# Patient Record
Sex: MALE | Race: OTHER | NOT HISPANIC OR LATINO | URBAN - METROPOLITAN AREA
[De-identification: names, ages, dates, MRNs, and addresses within clinical notes are randomized per-mention and may not be internally consistent; named-entity substitution may affect disease eponyms.]

---

## 2018-05-10 ENCOUNTER — INPATIENT (INPATIENT)
Facility: HOSPITAL | Age: 53
LOS: 5 days | Discharge: HOME HEALTH | End: 2018-05-16
Attending: THORACIC SURGERY (CARDIOTHORACIC VASCULAR SURGERY) | Admitting: THORACIC SURGERY (CARDIOTHORACIC VASCULAR SURGERY)
Payer: MEDICAID

## 2018-05-10 VITALS
SYSTOLIC BLOOD PRESSURE: 142 MMHG | HEART RATE: 72 BPM | TEMPERATURE: 95 F | RESPIRATION RATE: 20 BRPM | OXYGEN SATURATION: 98 % | DIASTOLIC BLOOD PRESSURE: 92 MMHG

## 2018-05-10 DIAGNOSIS — Z82.49 FAMILY HISTORY OF ISCHEMIC HEART DISEASE AND OTHER DISEASES OF THE CIRCULATORY SYSTEM: ICD-10-CM

## 2018-05-10 DIAGNOSIS — I25.119 ATHEROSCLEROTIC HEART DISEASE OF NATIVE CORONARY ARTERY WITH UNSPECIFIED ANGINA PECTORIS: ICD-10-CM

## 2018-05-10 DIAGNOSIS — R73.9 HYPERGLYCEMIA, UNSPECIFIED: ICD-10-CM

## 2018-05-10 DIAGNOSIS — I25.10 ATHEROSCLEROTIC HEART DISEASE OF NATIVE CORONARY ARTERY WITHOUT ANGINA PECTORIS: ICD-10-CM

## 2018-05-10 DIAGNOSIS — R94.39 ABNORMAL RESULT OF OTHER CARDIOVASCULAR FUNCTION STUDY: ICD-10-CM

## 2018-05-10 DIAGNOSIS — K63.5 POLYP OF COLON: Chronic | ICD-10-CM

## 2018-05-10 DIAGNOSIS — M54.9 DORSALGIA, UNSPECIFIED: ICD-10-CM

## 2018-05-10 DIAGNOSIS — Z98.890 OTHER SPECIFIED POSTPROCEDURAL STATES: Chronic | ICD-10-CM

## 2018-05-10 DIAGNOSIS — Z98.61 CORONARY ANGIOPLASTY STATUS: ICD-10-CM

## 2018-05-10 DIAGNOSIS — D62 ACUTE POSTHEMORRHAGIC ANEMIA: ICD-10-CM

## 2018-05-10 DIAGNOSIS — Z98.1 ARTHRODESIS STATUS: Chronic | ICD-10-CM

## 2018-05-10 DIAGNOSIS — Z98.1 ARTHRODESIS STATUS: ICD-10-CM

## 2018-05-10 DIAGNOSIS — Z87.19 PERSONAL HISTORY OF OTHER DISEASES OF THE DIGESTIVE SYSTEM: ICD-10-CM

## 2018-05-10 DIAGNOSIS — Z87.891 PERSONAL HISTORY OF NICOTINE DEPENDENCE: ICD-10-CM

## 2018-05-10 DIAGNOSIS — G89.29 OTHER CHRONIC PAIN: ICD-10-CM

## 2018-05-10 DIAGNOSIS — I10 ESSENTIAL (PRIMARY) HYPERTENSION: ICD-10-CM

## 2018-05-10 DIAGNOSIS — Z86.010 PERSONAL HISTORY OF COLONIC POLYPS: ICD-10-CM

## 2018-05-10 LAB
ALBUMIN SERPL ELPH-MCNC: 4.1 G/DL — SIGNIFICANT CHANGE UP (ref 3.5–5.2)
ALP SERPL-CCNC: 63 U/L — SIGNIFICANT CHANGE UP (ref 30–115)
ALT FLD-CCNC: 14 U/L — SIGNIFICANT CHANGE UP (ref 0–41)
ANION GAP SERPL CALC-SCNC: 13 MMOL/L — SIGNIFICANT CHANGE UP (ref 7–14)
APPEARANCE UR: CLEAR — SIGNIFICANT CHANGE UP
APTT BLD: 30.2 SEC — SIGNIFICANT CHANGE UP (ref 27–39.2)
AST SERPL-CCNC: 12 U/L — SIGNIFICANT CHANGE UP (ref 0–41)
BILIRUB SERPL-MCNC: 0.7 MG/DL — SIGNIFICANT CHANGE UP (ref 0.2–1.2)
BILIRUB UR-MCNC: NEGATIVE — SIGNIFICANT CHANGE UP
BLD GP AB SCN SERPL QL: SIGNIFICANT CHANGE UP
BUN SERPL-MCNC: 13 MG/DL — SIGNIFICANT CHANGE UP (ref 10–20)
CALCIUM SERPL-MCNC: 8.5 MG/DL — SIGNIFICANT CHANGE UP (ref 8.5–10.1)
CHLORIDE SERPL-SCNC: 102 MMOL/L — SIGNIFICANT CHANGE UP (ref 98–110)
CO2 SERPL-SCNC: 26 MMOL/L — SIGNIFICANT CHANGE UP (ref 17–32)
COLOR SPEC: YELLOW — SIGNIFICANT CHANGE UP
CREAT SERPL-MCNC: 0.8 MG/DL — SIGNIFICANT CHANGE UP (ref 0.7–1.5)
DIFF PNL FLD: NEGATIVE — SIGNIFICANT CHANGE UP
ESTIMATED AVERAGE GLUCOSE: 120 MG/DL — HIGH (ref 68–114)
GLUCOSE SERPL-MCNC: 103 MG/DL — HIGH (ref 70–99)
GLUCOSE UR QL: NEGATIVE MG/DL — SIGNIFICANT CHANGE UP
HBA1C BLD-MCNC: 5.8 % — HIGH (ref 4–5.6)
HCT VFR BLD CALC: 39.3 % — LOW (ref 42–52)
HGB BLD-MCNC: 13.9 G/DL — LOW (ref 14–18)
INR BLD: 1.07 RATIO — SIGNIFICANT CHANGE UP (ref 0.65–1.3)
KETONES UR-MCNC: NEGATIVE — SIGNIFICANT CHANGE UP
LEUKOCYTE ESTERASE UR-ACNC: NEGATIVE — SIGNIFICANT CHANGE UP
MCHC RBC-ENTMCNC: 29.8 PG — SIGNIFICANT CHANGE UP (ref 27–31)
MCHC RBC-ENTMCNC: 35.4 G/DL — SIGNIFICANT CHANGE UP (ref 32–37)
MCV RBC AUTO: 84.3 FL — SIGNIFICANT CHANGE UP (ref 80–94)
MRSA PCR RESULT.: NEGATIVE — SIGNIFICANT CHANGE UP
NITRITE UR-MCNC: NEGATIVE — SIGNIFICANT CHANGE UP
NRBC # BLD: 0 /100 WBCS — SIGNIFICANT CHANGE UP (ref 0–0)
PH UR: 6.5 — SIGNIFICANT CHANGE UP (ref 5–8)
PLATELET # BLD AUTO: 223 K/UL — SIGNIFICANT CHANGE UP (ref 130–400)
POTASSIUM SERPL-MCNC: 4.2 MMOL/L — SIGNIFICANT CHANGE UP (ref 3.5–5)
POTASSIUM SERPL-SCNC: 4.2 MMOL/L — SIGNIFICANT CHANGE UP (ref 3.5–5)
PROT SERPL-MCNC: 6.3 G/DL — SIGNIFICANT CHANGE UP (ref 6–8)
PROT UR-MCNC: NEGATIVE MG/DL — SIGNIFICANT CHANGE UP
PROTHROM AB SERPL-ACNC: 11.6 SEC — SIGNIFICANT CHANGE UP (ref 9.95–12.87)
RBC # BLD: 4.66 M/UL — LOW (ref 4.7–6.1)
RBC # FLD: 11.9 % — SIGNIFICANT CHANGE UP (ref 11.5–14.5)
SODIUM SERPL-SCNC: 141 MMOL/L — SIGNIFICANT CHANGE UP (ref 135–146)
SP GR SPEC: 1.01 — SIGNIFICANT CHANGE UP (ref 1.01–1.03)
TYPE + AB SCN PNL BLD: SIGNIFICANT CHANGE UP
UROBILINOGEN FLD QL: 0.2 MG/DL — SIGNIFICANT CHANGE UP (ref 0.2–0.2)
WBC # BLD: 5.44 K/UL — SIGNIFICANT CHANGE UP (ref 4.8–10.8)
WBC # FLD AUTO: 5.44 K/UL — SIGNIFICANT CHANGE UP (ref 4.8–10.8)

## 2018-05-10 PROCEDURE — 93880 EXTRACRANIAL BILAT STUDY: CPT | Mod: 26

## 2018-05-10 RX ORDER — METOPROLOL TARTRATE 50 MG
12.5 TABLET ORAL EVERY 12 HOURS
Qty: 0 | Refills: 0 | Status: DISCONTINUED | OUTPATIENT
Start: 2018-05-10 | End: 2018-05-10

## 2018-05-10 RX ORDER — ASPIRIN/CALCIUM CARB/MAGNESIUM 324 MG
325 TABLET ORAL DAILY
Qty: 0 | Refills: 0 | Status: DISCONTINUED | OUTPATIENT
Start: 2018-05-10 | End: 2018-05-11

## 2018-05-10 RX ORDER — DIAZEPAM 5 MG
10 TABLET ORAL AT BEDTIME
Qty: 0 | Refills: 0 | Status: DISCONTINUED | OUTPATIENT
Start: 2018-05-10 | End: 2018-05-10

## 2018-05-10 RX ORDER — CHLORHEXIDINE GLUCONATE 213 G/1000ML
1 SOLUTION TOPICAL ONCE
Qty: 0 | Refills: 0 | Status: COMPLETED | OUTPATIENT
Start: 2018-05-10 | End: 2018-05-10

## 2018-05-10 RX ORDER — SODIUM CHLORIDE 9 MG/ML
1000 INJECTION INTRAMUSCULAR; INTRAVENOUS; SUBCUTANEOUS
Qty: 0 | Refills: 0 | Status: DISCONTINUED | OUTPATIENT
Start: 2018-05-10 | End: 2018-05-11

## 2018-05-10 RX ORDER — ACETAMINOPHEN 500 MG
650 TABLET ORAL EVERY 4 HOURS
Qty: 0 | Refills: 0 | Status: DISCONTINUED | OUTPATIENT
Start: 2018-05-10 | End: 2018-05-11

## 2018-05-10 RX ORDER — METOPROLOL TARTRATE 50 MG
12.5 TABLET ORAL ONCE
Qty: 0 | Refills: 0 | Status: COMPLETED | OUTPATIENT
Start: 2018-05-11 | End: 2018-05-11

## 2018-05-10 RX ORDER — ATORVASTATIN CALCIUM 80 MG/1
10 TABLET, FILM COATED ORAL AT BEDTIME
Qty: 0 | Refills: 0 | Status: DISCONTINUED | OUTPATIENT
Start: 2018-05-10 | End: 2018-05-11

## 2018-05-10 RX ORDER — DIAZEPAM 5 MG
5 TABLET ORAL ONCE
Qty: 0 | Refills: 0 | Status: DISCONTINUED | OUTPATIENT
Start: 2018-05-10 | End: 2018-05-10

## 2018-05-10 RX ORDER — CHLORHEXIDINE GLUCONATE 213 G/1000ML
15 SOLUTION TOPICAL ONCE
Qty: 0 | Refills: 0 | Status: COMPLETED | OUTPATIENT
Start: 2018-05-10 | End: 2018-05-11

## 2018-05-10 RX ORDER — CHLORHEXIDINE GLUCONATE 213 G/1000ML
1 SOLUTION TOPICAL ONCE
Qty: 0 | Refills: 0 | Status: COMPLETED | OUTPATIENT
Start: 2018-05-11 | End: 2018-05-11

## 2018-05-10 RX ORDER — HEPARIN SODIUM 5000 [USP'U]/ML
INJECTION INTRAVENOUS; SUBCUTANEOUS
Qty: 25000 | Refills: 0 | Status: DISCONTINUED | OUTPATIENT
Start: 2018-05-10 | End: 2018-05-11

## 2018-05-10 RX ORDER — PANTOPRAZOLE SODIUM 20 MG/1
40 TABLET, DELAYED RELEASE ORAL
Qty: 0 | Refills: 0 | Status: DISCONTINUED | OUTPATIENT
Start: 2018-05-10 | End: 2018-05-11

## 2018-05-10 RX ADMIN — CHLORHEXIDINE GLUCONATE 1 APPLICATION(S): 213 SOLUTION TOPICAL at 22:47

## 2018-05-10 RX ADMIN — Medication 12.5 MILLIGRAM(S): at 17:28

## 2018-05-10 RX ADMIN — Medication 5 MILLIGRAM(S): at 11:44

## 2018-05-10 RX ADMIN — CHLORHEXIDINE GLUCONATE 1 APPLICATION(S): 213 SOLUTION TOPICAL at 20:00

## 2018-05-10 RX ADMIN — SODIUM CHLORIDE 50 MILLILITER(S): 9 INJECTION INTRAMUSCULAR; INTRAVENOUS; SUBCUTANEOUS at 08:01

## 2018-05-10 RX ADMIN — Medication 325 MILLIGRAM(S): at 11:45

## 2018-05-10 RX ADMIN — Medication 12.5 MILLIGRAM(S): at 11:29

## 2018-05-10 RX ADMIN — ATORVASTATIN CALCIUM 10 MILLIGRAM(S): 80 TABLET, FILM COATED ORAL at 21:33

## 2018-05-10 RX ADMIN — PANTOPRAZOLE SODIUM 40 MILLIGRAM(S): 20 TABLET, DELAYED RELEASE ORAL at 11:29

## 2018-05-10 RX ADMIN — HEPARIN SODIUM 830 UNIT(S)/HR: 5000 INJECTION INTRAVENOUS; SUBCUTANEOUS at 13:36

## 2018-05-10 RX ADMIN — SODIUM CHLORIDE 50 MILLILITER(S): 9 INJECTION INTRAMUSCULAR; INTRAVENOUS; SUBCUTANEOUS at 11:45

## 2018-05-10 RX ADMIN — SODIUM CHLORIDE 50 MILLILITER(S): 9 INJECTION INTRAMUSCULAR; INTRAVENOUS; SUBCUTANEOUS at 13:36

## 2018-05-10 RX ADMIN — Medication 10 MILLIGRAM(S): at 21:34

## 2018-05-10 NOTE — PRE-ANESTHESIA EVALUATION ADULT - NSANTHLABRESULTSFT_GEN_ALL_CORE
13.9   5.44  )-----------( 223      ( 10 May 2018 07:11 )             39.3     05-10    141  |  102  |  13  ----------------------------<  103<H>  4.2   |  26  |  0.8    Ca    8.5      10 May 2018 07:11    TPro  6.3  /  Alb  4.1  /  TBili  0.7  /  DBili  x   /  AST  12  /  ALT  14  /  AlkPhos  63  05-10    PT/INR - ( 10 May 2018 07:11 )   PT: 11.60 sec;   INR: 1.07 ratio         PTT - ( 10 May 2018 07:11 )  PTT:30.2 sec

## 2018-05-10 NOTE — ED PROVIDER NOTE - CARE PLAN
Principal Discharge DX:	Coronary artery disease involving native coronary artery of native heart, angina presence unspecified

## 2018-05-10 NOTE — ED ADULT TRIAGE NOTE - CHIEF COMPLAINT QUOTE
Pt came c/o left sided chest pain started this morning Pt came c/o left sided chest pain started this morning, pt stated he had an abnormal cardiac cath. yesterday.

## 2018-05-10 NOTE — ED PROVIDER NOTE - NS ED ROS FT
Constitutional: (-) fever  Eyes/ENT: (-) blurry vision, (-) epistaxis  Cardiovascular: (-) chest pain, (-) syncope  Respiratory: (-) cough, (-) shortness of breath  Gastrointestinal: (-) vomiting, (-) diarrhea  Musculoskeletal: (-) neck pain, (+) chronic back pain, (-) joint pain  Integumentary: (-) rash, (-) edema  Neurological: (-) headache, (-) altered mental status  Psychiatric: (-) hallucinations  Allergic/Immunologic: (-) pruritus

## 2018-05-10 NOTE — PRE-ANESTHESIA EVALUATION ADULT - NSANTHOSAYNRD_GEN_A_CORE
No. IBRAHIMA screening performed.  STOP BANG Legend: 0-2 = LOW Risk; 3-4 = INTERMEDIATE Risk; 5-8 = HIGH Risk

## 2018-05-10 NOTE — H&P ADULT - NSHPPHYSICALEXAM_GEN_ALL_CORE
Alert and Oriented, well nourished well hydrated  PERRLA, EOMI, anicteric  no nasal or ear discharge  oral hygiene good  neck supple no JVD or goiter  chest: CTA   COr; RSR, s1  s2 no murmur  ABD: soft NT ND = BS  Ext: no edema, full ROM, good strength, + scar on back  VAsc: 2+ DP, FEM, RAD and brachial bilateral  Neuro: no focal deficits

## 2018-05-10 NOTE — ED PROVIDER NOTE - PHYSICAL EXAMINATION
Vital Signs: I have reviewed the initial vital signs.  Constitutional: well-nourished, appears stated age, no acute distress  HEENT; PERRL, pink conjunctivae, supple neck  Cardiovascular: regular rate, regular rhythm, well-perfused extremities, capillary refill < 2 sec  Respiratory: unlabored respiratory effort, clear to auscultation bilaterally, speaking full sentences  Gastrointestinal: soft, non-tender abdomen, no pulsatile mass  Musculoskeletal: supple neck, no lower extremity edema, FROM at all joints  Integumentary: warm, dry, no rash  Neurologic: A&Ox3, no gross neuro deficits  Psychiatric: appropriate mood, appropriate affect

## 2018-05-10 NOTE — ED ADULT NURSE NOTE - CHIEF COMPLAINT QUOTE
Pt came c/o left sided chest pain started this morning, pt stated he had an abnormal cardiac cath. yesterday.

## 2018-05-10 NOTE — ED PROVIDER NOTE - OBJECTIVE STATEMENT
53 yo male h/o CBP s/p spinal fusion presenting for admission after abnormal cardiac cath 2 days ago at a hospital in [place].  Patient reports h/o abnormal ECG and stress test obtained as a part of medical clearance.  His father passed away suddenly a year ago and was found to have severe 3 vessel disease.  Denies pain a present or any other acute complaints.  Will admit to CT surgery for definitive treatment.  Orders were placed by CT surgery PA.

## 2018-05-10 NOTE — H&P ADULT - PMH
Coronary artery disease involving native coronary artery of native heart, angina presence unspecified    Disc disorder of lumbar region    Gastritis due to Helicobacter species

## 2018-05-10 NOTE — H&P ADULT - HISTORY OF PRESENT ILLNESS
53 yo male presents to ED with anginal symptoms and known left main coronary artery disease. He had recent catheterization 18 that demonstrated advanced CAD. Catheterization was done secondary to + stress test. Primary symptoms of CRAWFORD, easily fatigability and intermittent left chest discomfort/ itching sensation. NO Chest pain now. no diaphoresis. Father  of MI at age 70

## 2018-05-10 NOTE — PRE-ANESTHESIA EVALUATION ADULT - NSANTHPMHFT_GEN_ALL_CORE
52 y.o M with PMHx of CAD presented with dyspnea on exertion, left chest discomfort. Known h/o left main coronary disease with recent catheterization 5/8/18. For CABG. 52 y.o M with PMHx of CAD presented with dyspnea on exertion, left chest discomfort. Known h/o left main coronary disease with recent catheterization 5/8/18 at outside facility. For CABG.

## 2018-05-10 NOTE — PROGRESS NOTE ADULT - SUBJECTIVE AND OBJECTIVE BOX
Cardiac Surgery Pre-op Note:  CC: Patient is a 52y old  Male who presents with a chief complaint of angina  with coronary artery disease (10 May 2018 07:11)      Referring Physician:    Keon Dykes MD cardiologist  	                    Filomena Mckay MD primary                                                                          Surgeon: Kerrie  Procedure: (Date) (Procedure) 18 CABG    Allergies    No Known Allergies  Intolerances    HPI:  53 yo male presents to ED with anginal symptoms and known left main coronary artery disease. He had recent catheterization 18 that demonstrated advanced CAD. Catheterization was done secondary to + stress test. Primary symptoms of CRAWFORD, easily fatigability and intermittent left chest discomfort/ itching sensation. NO Chest pain now. no diaphoresis. Father  of MI at age 70 (10 May 2018 07:11)      PAST MEDICAL & SURGICAL HISTORY:  Gastritis due to Helicobacter species  Disc disorder of lumbar region  Coronary artery disease involving native coronary artery of native heart, angina presence unspecified  Colorectal polyp detected on colonoscopy  H/O: hemorrhoidectomy  History of lumbar fusion      MEDICATIONS  (STANDING):  aspirin 325 milliGRAM(s) Oral daily  atorvastatin 10 milliGRAM(s) Oral at bedtime  diazepam    Tablet 5 milliGRAM(s) Oral at bedtime  heparin  Infusion.  Unit(s)/Hr (8.3 mL/Hr) IV Continuous <Continuous>  metoprolol tartrate 12.5 milliGRAM(s) Oral every 12 hours  pantoprazole    Tablet 40 milliGRAM(s) Oral before breakfast  sodium chloride 0.9%. 1000 milliLiter(s) (50 mL/Hr) IV Continuous <Continuous>    MEDICATIONS  (PRN):    Labs:                      13.9   5.44  )-----------( 223      ( 10 May 2018 07:11 )             39.3     05-10    141  |  102  |  13  ----------------------------<  103<H>  4.2   |  26  |  0.8    Ca    8.5      10 May 2018 07:11    TPro  6.3  /  Alb  4.1  /  TBili  0.7  /  DBili  x   /  AST  12  /  ALT  14  /  AlkPhos  63  05-10    PT/INR - ( 10 May 2018 07:11 )   PT: 11.60 sec;   INR: 1.07 ratio         PTT - ( 10 May 2018 07:11 )  PTT:30.2 sec    Blood Type:   HGB A1C: Hemoglobin A1C, Whole Blood: 5.8 % (05-10 @ 07:11)    Urinalysis Basic - ( 10 May 2018 07:11 )    Color: Yellow / Appearance: Clear / S.015 / pH: x  Gluc: x / Ketone: Negative  / Bili: Negative / Urobili: 0.2 mg/dL   Blood: x / Protein: Negative mg/dL / Nitrite: Negative   Leuk Esterase: Negative / RBC: x / WBC x   Sq Epi: x / Non Sq Epi: x / Bacteria: x    CXR:   < from: Xray Chest 1 View- PORTABLE-Routine (05.10.18 @ 09:02) >  Impression:      No radiographic evidence of acute cardiopulmonary disease.    < end of copied text >    EKG:  < from: 12 Lead ECG (05.10.18 @ 06:22) >  Diagnosis Line Normal sinus rhythm  Normal ECG    < end of copied text >    Carotid Duplex:    < from: VA Duplex Carotid, Bilat (05.10.18 @ 10:36) >  20-39% stenosis of the right internal carotid artery.    20-39% stenosis of the left internal carotid artery.    < end of copied text >    PFT's: N/A    Echocardiogram:  official pending , normal EF    Cardiac catheterization: left main with multi-vessel involvement    Gen: WN/WD NAD  Neuro: A&Ox3, nonfocal  Pulm: CTA B/L  CV: RRR, S1S2 systolic murmur  Abd: Soft, NT, ND +BS  Ext: No edema, + peripheral pulses, Floyd negative right, ? on left    Cardic Surgery Risk Factors  CVA and/or carotid/cerebrovascular disease. Yes  No  Explain if Yes 20-39% bilateral  Aortoiliac disease Yes  No  Explain if Yes  Previous MI Yes  No  Explain if Yes  Previos Cardiac Surgery Yes  No  Explain if Yes  Hemodynamics-Unstable or Shock Yes  No  Explain if Yes  Diabetis Yes  No  Explain if Yes  Hepatic Failure Yes  No  Explain if Yes  Renal failure and/or dialysis Yes  No  Explain if Yes  Heart failure-type-present or past Yes  No  Explain if Yes  COPD Yes  No  Explain if Yes  Immune System Deficiency Yes No  Explain if Yes  Malignant Ventricular Arrhythmia Yes No  Explain if Yes  Family hx: father MI 69 yo    Five Meter: 7.1/6.5/6.3    BP    R 121/85   L127 / 90    STS Score:   Procedure: CAB Only (with normal valve data assumed)    Risk of Mortality: 1.28%    Morbidity or Mortality: 11.291%    Long Length of Stay: 2.943%    Short Length of Stay: 51.803%    Permanent Stroke: 0.459%    Prolonged Ventilation: 8.557%    DSW Infection: 0.204%    Renal Failure: N/A    Reoperation: 5.13%        Pt has AICD/PPM [ ] Yes  [x ] No             Brand Name:  Pre-op Beta Blocker ordered within 24 hrs of surgery (CABG ONLY)?  [x ] Yes  [ ] No  If not, Why?  Type & Cross  [ X ] Yes  [ ] No  NPO after Midnight [x ] Yes  [ ] No  Hibiclens/Peridex ordered [x ] Yes  [ ] No  Intraop on Hold: PRBCs, CXR, ANAHI [x ]   Consent obtained  [x ] Yes  [ ] No Cardiac Surgery Pre-op Note:  CC: Patient is a 52y old  Male who presents with a chief complaint of angina  with coronary artery disease (10 May 2018 07:11)      Referring Physician:    Keon Dykes MD cardiologist  	                    Filomena Mckay MD primary                                                                          Surgeon: Kerrie  Procedure: (Date) (Procedure) 18 CABG    Allergies    No Known Allergies  Intolerances    HPI:  53 yo male presents to ED with anginal symptoms and known left main coronary artery disease. He had recent catheterization 18 that demonstrated advanced CAD. Catheterization was done secondary to + stress test. Primary symptoms of CRAWFORD, easily fatigability and intermittent left chest discomfort/ itching sensation. NO Chest pain now. no diaphoresis. Father  of MI at age 70 (10 May 2018 07:11)      PAST MEDICAL & SURGICAL HISTORY:  Gastritis due to Helicobacter species  Disc disorder of lumbar region  Coronary artery disease involving native coronary artery of native heart, angina presence unspecified  Colorectal polyp detected on colonoscopy  H/O: hemorrhoidectomy  History of lumbar fusion      MEDICATIONS  (STANDING):  aspirin 325 milliGRAM(s) Oral daily  atorvastatin 10 milliGRAM(s) Oral at bedtime  diazepam    Tablet 5 milliGRAM(s) Oral at bedtime  heparin  Infusion.  Unit(s)/Hr (8.3 mL/Hr) IV Continuous <Continuous>  metoprolol tartrate 12.5 milliGRAM(s) Oral every 12 hours  pantoprazole    Tablet 40 milliGRAM(s) Oral before breakfast  sodium chloride 0.9%. 1000 milliLiter(s) (50 mL/Hr) IV Continuous <Continuous>    MEDICATIONS  (PRN):    Labs:                      13.9   5.44  )-----------( 223      ( 10 May 2018 07:11 )             39.3     05-10    141  |  102  |  13  ----------------------------<  103<H>  4.2   |  26  |  0.8    Ca    8.5      10 May 2018 07:11    TPro  6.3  /  Alb  4.1  /  TBili  0.7  /  DBili  x   /  AST  12  /  ALT  14  /  AlkPhos  63  05-10    PT/INR - ( 10 May 2018 07:11 )   PT: 11.60 sec;   INR: 1.07 ratio         PTT - ( 10 May 2018 07:11 )  PTT:30.2 sec    Blood Type:   HGB A1C: Hemoglobin A1C, Whole Blood: 5.8 % (05-10 @ 07:11)    Urinalysis Basic - ( 10 May 2018 07:11 )    Color: Yellow / Appearance: Clear / S.015 / pH: x  Gluc: x / Ketone: Negative  / Bili: Negative / Urobili: 0.2 mg/dL   Blood: x / Protein: Negative mg/dL / Nitrite: Negative   Leuk Esterase: Negative / RBC: x / WBC x   Sq Epi: x / Non Sq Epi: x / Bacteria: x    CXR:   < from: Xray Chest 1 View- PORTABLE-Routine (05.10.18 @ 09:02) >  Impression:      No radiographic evidence of acute cardiopulmonary disease.    < end of copied text >    EKG:  < from: 12 Lead ECG (05.10.18 @ 06:22) >  Diagnosis Line Normal sinus rhythm  Normal ECG    < end of copied text >    Carotid Duplex:    < from: VA Duplex Carotid, Bilat (05.10.18 @ 10:36) >  20-39% stenosis of the right internal carotid artery.    20-39% stenosis of the left internal carotid artery.    < end of copied text >    PFT's: N/A    Echocardiogram:  < from: Transthoracic Echocardiogram (05.10.18 @ 14:51) >  Summary:   1. Left ventricular ejection fraction, by visual estimation, is 55 to   60%.   2. Normal global left ventricular systolic function.   3. Spectral Doppler shows impaired relaxation pattern of left   ventricular myocardial filling (Grade I diastolic dysfunction).   4. Mild mitral valve regurgitation.    Cardiac catheterization: left main with multi-vessel involvement    Gen: WN/WD NAD  Neuro: A&Ox3, nonfocal  Pulm: CTA B/L  CV: RRR, S1S2 systolic murmur  Abd: Soft, NT, ND +BS  Ext: No edema, + peripheral pulses, Floyd negative right, ? on left    Cardic Surgery Risk Factors  CVA and/or carotid/cerebrovascular disease. Yes  No  Explain if Yes 20-39% bilateral  Aortoiliac disease Yes  No  Explain if Yes  Previous MI Yes  No  Explain if Yes  Previos Cardiac Surgery Yes  No  Explain if Yes  Hemodynamics-Unstable or Shock Yes  No  Explain if Yes  Diabetis Yes  No  Explain if Yes  Hepatic Failure Yes  No  Explain if Yes  Renal failure and/or dialysis Yes  No  Explain if Yes  Heart failure-type-present or past Yes  No  Explain if Yes  COPD Yes  No  Explain if Yes  Immune System Deficiency Yes No  Explain if Yes  Malignant Ventricular Arrhythmia Yes No  Explain if Yes  Family hx: father MI 69 yo    Five Meter: 7.1/6.5/6.3    BP    R 121/85   L127 / 90    STS Score:   Procedure: CAB Only     Risk of Mortality: 1.28%    Morbidity or Mortality: 11.291%    Long Length of Stay: 2.943%    Short Length of Stay: 51.803%    Permanent Stroke: 0.459%    Prolonged Ventilation: 8.557%    DSW Infection: 0.204%    Renal Failure: N/A    Reoperation: 5.13%        Pt has AICD/PPM [ ] Yes  [x ] No             Brand Name:  Pre-op Beta Blocker ordered within 24 hrs of surgery (CABG ONLY)?  [x ] Yes  [ ] No  If not, Why?  Type & Cross  [ X ] Yes  [ ] No  NPO after Midnight [x ] Yes  [ ] No  Hibiclens/Peridex ordered [x ] Yes  [ ] No  Intraop on Hold: PRBCs, CXR, ANAHI [x ]   Consent obtained  [x ] Yes  [ ] No

## 2018-05-10 NOTE — ED ADULT NURSE NOTE - OBJECTIVE STATEMENT
Pt comes to ED for evaluation of abnormal cardiac cath in [place] a few days ago. Cath was done after pt was found to have abnormal EKG and stress test as park of pre op clearance for spinal fusion. Pt states he has very mild chest pain.

## 2018-05-10 NOTE — PRE-ANESTHESIA EVALUATION ADULT - NSRADCARDRESULTSFT_GEN_ALL_CORE
CXR:   < from: Xray Chest 1 View- PORTABLE-Routine (05.10.18 @ 09:02) >  Impression:      No radiographic evidence of acute cardiopulmonary disease.    EKG:  < from: 12 Lead ECG (05.10.18 @ 06:22) >  Normal sinus rhythm      Carotid Duplex:    < from: VA Duplex Carotid, Bilat (05.10.18 @ 10:36) >  20-39% stenosis of the right internal carotid artery.  20-39% stenosis of the left internal carotid artery.    Echocardiogram:  < from: Transthoracic Echocardiogram (05.10.18 @ 14:51) >  Summary:   1. Left ventricular ejection fraction, by visual estimation, is 55 to   60%.   2. Normal global left ventricular systolic function.   3. Spectral Doppler shows impaired relaxation pattern of left   ventricular myocardial filling (Grade I diastolic dysfunction).   4. Mild mitral valve regurgitation.    Cardiac catheterization: left main with multi-vessel involvement

## 2018-05-11 LAB
ALBUMIN SERPL ELPH-MCNC: 4.3 G/DL — SIGNIFICANT CHANGE UP (ref 3.5–5.2)
ALP SERPL-CCNC: 39 U/L — SIGNIFICANT CHANGE UP (ref 30–115)
ALT FLD-CCNC: 14 U/L — SIGNIFICANT CHANGE UP (ref 0–41)
ANION GAP SERPL CALC-SCNC: 15 MMOL/L — HIGH (ref 7–14)
APTT BLD: 26.1 SEC — LOW (ref 27–39.2)
APTT BLD: 41.5 SEC — HIGH (ref 27–39.2)
AST SERPL-CCNC: 20 U/L — SIGNIFICANT CHANGE UP (ref 0–41)
BASOPHILS # BLD AUTO: 0.01 K/UL — SIGNIFICANT CHANGE UP (ref 0–0.2)
BASOPHILS NFR BLD AUTO: 0.1 % — SIGNIFICANT CHANGE UP (ref 0–1)
BILIRUB SERPL-MCNC: 1.2 MG/DL — SIGNIFICANT CHANGE UP (ref 0.2–1.2)
BLD GP AB SCN SERPL QL: SIGNIFICANT CHANGE UP
BUN SERPL-MCNC: 10 MG/DL — SIGNIFICANT CHANGE UP (ref 10–20)
CALCIUM SERPL-MCNC: 7.7 MG/DL — LOW (ref 8.5–10.1)
CHLORIDE SERPL-SCNC: 105 MMOL/L — SIGNIFICANT CHANGE UP (ref 98–110)
CO2 SERPL-SCNC: 21 MMOL/L — SIGNIFICANT CHANGE UP (ref 17–32)
CREAT SERPL-MCNC: 0.7 MG/DL — SIGNIFICANT CHANGE UP (ref 0.7–1.5)
EOSINOPHIL # BLD AUTO: 0.04 K/UL — SIGNIFICANT CHANGE UP (ref 0–0.7)
EOSINOPHIL NFR BLD AUTO: 0.4 % — SIGNIFICANT CHANGE UP (ref 0–8)
GAS PNL BLDA: SIGNIFICANT CHANGE UP
GLUCOSE SERPL-MCNC: 94 MG/DL — SIGNIFICANT CHANGE UP (ref 70–99)
HCT VFR BLD CALC: 30.3 % — LOW (ref 42–52)
HGB BLD-MCNC: 10.9 G/DL — LOW (ref 14–18)
IMM GRANULOCYTES NFR BLD AUTO: 1.2 % — HIGH (ref 0.1–0.3)
INR BLD: 1.06 RATIO — SIGNIFICANT CHANGE UP (ref 0.65–1.3)
INR BLD: 1.26 RATIO — SIGNIFICANT CHANGE UP (ref 0.65–1.3)
LYMPHOCYTES # BLD AUTO: 1.23 K/UL — SIGNIFICANT CHANGE UP (ref 1.2–3.4)
LYMPHOCYTES # BLD AUTO: 11 % — LOW (ref 20.5–51.1)
MAGNESIUM SERPL-MCNC: 2.8 MG/DL — HIGH (ref 1.8–2.4)
MCHC RBC-ENTMCNC: 30 PG — SIGNIFICANT CHANGE UP (ref 27–31)
MCHC RBC-ENTMCNC: 36 G/DL — SIGNIFICANT CHANGE UP (ref 32–37)
MCV RBC AUTO: 83.5 FL — SIGNIFICANT CHANGE UP (ref 80–94)
MONOCYTES # BLD AUTO: 0.55 K/UL — SIGNIFICANT CHANGE UP (ref 0.1–0.6)
MONOCYTES NFR BLD AUTO: 4.9 % — SIGNIFICANT CHANGE UP (ref 1.7–9.3)
NEUTROPHILS # BLD AUTO: 9.21 K/UL — HIGH (ref 1.4–6.5)
NEUTROPHILS NFR BLD AUTO: 82.4 % — HIGH (ref 42.2–75.2)
NRBC # BLD: 0 /100 WBCS — SIGNIFICANT CHANGE UP (ref 0–0)
PHOSPHATE SERPL-MCNC: 2.8 MG/DL — SIGNIFICANT CHANGE UP (ref 2.1–4.9)
PLATELET # BLD AUTO: 139 K/UL — SIGNIFICANT CHANGE UP (ref 130–400)
POTASSIUM SERPL-MCNC: 3.8 MMOL/L — SIGNIFICANT CHANGE UP (ref 3.5–5)
POTASSIUM SERPL-SCNC: 3.8 MMOL/L — SIGNIFICANT CHANGE UP (ref 3.5–5)
PROT SERPL-MCNC: 5.6 G/DL — LOW (ref 6–8)
PROTHROM AB SERPL-ACNC: 11.5 SEC — SIGNIFICANT CHANGE UP (ref 9.95–12.87)
PROTHROM AB SERPL-ACNC: 13.7 SEC — HIGH (ref 9.95–12.87)
RBC # BLD: 3.63 M/UL — LOW (ref 4.7–6.1)
RBC # FLD: 11.7 % — SIGNIFICANT CHANGE UP (ref 11.5–14.5)
SODIUM SERPL-SCNC: 141 MMOL/L — SIGNIFICANT CHANGE UP (ref 135–146)
TYPE + AB SCN PNL BLD: SIGNIFICANT CHANGE UP
WBC # BLD: 11.17 K/UL — HIGH (ref 4.8–10.8)
WBC # FLD AUTO: 11.17 K/UL — HIGH (ref 4.8–10.8)

## 2018-05-11 RX ORDER — KETOROLAC TROMETHAMINE 30 MG/ML
15 SYRINGE (ML) INJECTION ONCE
Qty: 0 | Refills: 0 | Status: DISCONTINUED | OUTPATIENT
Start: 2018-05-11 | End: 2018-05-11

## 2018-05-11 RX ORDER — KETOROLAC TROMETHAMINE 30 MG/ML
15 SYRINGE (ML) INJECTION EVERY 6 HOURS
Qty: 0 | Refills: 0 | Status: DISCONTINUED | OUTPATIENT
Start: 2018-05-11 | End: 2018-05-12

## 2018-05-11 RX ORDER — PROPOFOL 10 MG/ML
25 INJECTION, EMULSION INTRAVENOUS
Qty: 1000 | Refills: 0 | Status: DISCONTINUED | OUTPATIENT
Start: 2018-05-11 | End: 2018-05-12

## 2018-05-11 RX ORDER — ONDANSETRON 8 MG/1
4 TABLET, FILM COATED ORAL ONCE
Qty: 0 | Refills: 0 | Status: DISCONTINUED | OUTPATIENT
Start: 2018-05-11 | End: 2018-05-16

## 2018-05-11 RX ORDER — IPRATROPIUM BROMIDE 0.2 MG/ML
2 SOLUTION, NON-ORAL INHALATION EVERY 6 HOURS
Qty: 0 | Refills: 0 | Status: DISCONTINUED | OUTPATIENT
Start: 2018-05-11 | End: 2018-05-12

## 2018-05-11 RX ORDER — OXYCODONE HYDROCHLORIDE 5 MG/1
5 TABLET ORAL EVERY 4 HOURS
Qty: 0 | Refills: 0 | Status: DISCONTINUED | OUTPATIENT
Start: 2018-05-11 | End: 2018-05-14

## 2018-05-11 RX ORDER — POTASSIUM CHLORIDE 20 MEQ
20 PACKET (EA) ORAL
Qty: 0 | Refills: 0 | Status: COMPLETED | OUTPATIENT
Start: 2018-05-11 | End: 2018-05-11

## 2018-05-11 RX ORDER — CHLORHEXIDINE GLUCONATE 213 G/1000ML
5 SOLUTION TOPICAL EVERY 4 HOURS
Qty: 0 | Refills: 0 | Status: DISCONTINUED | OUTPATIENT
Start: 2018-05-11 | End: 2018-05-12

## 2018-05-11 RX ORDER — MEPERIDINE HYDROCHLORIDE 50 MG/ML
25 INJECTION INTRAMUSCULAR; INTRAVENOUS; SUBCUTANEOUS ONCE
Qty: 0 | Refills: 0 | Status: DISCONTINUED | OUTPATIENT
Start: 2018-05-11 | End: 2018-05-12

## 2018-05-11 RX ORDER — ALBUMIN HUMAN 25 %
3000 VIAL (ML) INTRAVENOUS ONCE
Qty: 0 | Refills: 0 | Status: DISCONTINUED | OUTPATIENT
Start: 2018-05-11 | End: 2018-05-11

## 2018-05-11 RX ORDER — ALBUTEROL 90 UG/1
2 AEROSOL, METERED ORAL EVERY 6 HOURS
Qty: 0 | Refills: 0 | Status: DISCONTINUED | OUTPATIENT
Start: 2018-05-11 | End: 2018-05-12

## 2018-05-11 RX ORDER — SODIUM CHLORIDE 9 MG/ML
1000 INJECTION INTRAMUSCULAR; INTRAVENOUS; SUBCUTANEOUS
Qty: 0 | Refills: 0 | Status: DISCONTINUED | OUTPATIENT
Start: 2018-05-11 | End: 2018-05-12

## 2018-05-11 RX ORDER — CEFAZOLIN SODIUM 1 G
1000 VIAL (EA) INJECTION EVERY 8 HOURS
Qty: 0 | Refills: 0 | Status: COMPLETED | OUTPATIENT
Start: 2018-05-11 | End: 2018-05-12

## 2018-05-11 RX ORDER — NITROGLYCERIN 6.5 MG
5 CAPSULE, EXTENDED RELEASE ORAL
Qty: 50 | Refills: 0 | Status: DISCONTINUED | OUTPATIENT
Start: 2018-05-11 | End: 2018-05-12

## 2018-05-11 RX ORDER — INSULIN HUMAN 100 [IU]/ML
1 INJECTION, SOLUTION SUBCUTANEOUS
Qty: 100 | Refills: 0 | Status: DISCONTINUED | OUTPATIENT
Start: 2018-05-11 | End: 2018-05-12

## 2018-05-11 RX ORDER — NOREPINEPHRINE BITARTRATE/D5W 8 MG/250ML
0.05 PLASTIC BAG, INJECTION (ML) INTRAVENOUS
Qty: 8 | Refills: 0 | Status: DISCONTINUED | OUTPATIENT
Start: 2018-05-11 | End: 2018-05-12

## 2018-05-11 RX ORDER — METOPROLOL TARTRATE 50 MG
5 TABLET ORAL ONCE
Qty: 0 | Refills: 0 | Status: COMPLETED | OUTPATIENT
Start: 2018-05-11 | End: 2018-05-11

## 2018-05-11 RX ORDER — MAGNESIUM SULFATE 500 MG/ML
1 VIAL (ML) INJECTION EVERY 12 HOURS
Qty: 0 | Refills: 0 | Status: DISCONTINUED | OUTPATIENT
Start: 2018-05-11 | End: 2018-05-16

## 2018-05-11 RX ORDER — FENTANYL CITRATE 50 UG/ML
25 INJECTION INTRAVENOUS ONCE
Qty: 0 | Refills: 0 | Status: DISCONTINUED | OUTPATIENT
Start: 2018-05-11 | End: 2018-05-11

## 2018-05-11 RX ORDER — OXYCODONE HYDROCHLORIDE 5 MG/1
10 TABLET ORAL EVERY 4 HOURS
Qty: 0 | Refills: 0 | Status: DISCONTINUED | OUTPATIENT
Start: 2018-05-11 | End: 2018-05-14

## 2018-05-11 RX ORDER — NICARDIPINE HYDROCHLORIDE 30 MG/1
5 CAPSULE, EXTENDED RELEASE ORAL
Qty: 40 | Refills: 0 | Status: DISCONTINUED | OUTPATIENT
Start: 2018-05-11 | End: 2018-05-12

## 2018-05-11 RX ORDER — DOCUSATE SODIUM 100 MG
100 CAPSULE ORAL THREE TIMES A DAY
Qty: 0 | Refills: 0 | Status: DISCONTINUED | OUTPATIENT
Start: 2018-05-11 | End: 2018-05-16

## 2018-05-11 RX ORDER — FAMOTIDINE 10 MG/ML
20 INJECTION INTRAVENOUS EVERY 12 HOURS
Qty: 0 | Refills: 0 | Status: DISCONTINUED | OUTPATIENT
Start: 2018-05-11 | End: 2018-05-12

## 2018-05-11 RX ADMIN — Medication 1.5 MICROGRAM(S)/MIN: at 14:44

## 2018-05-11 RX ADMIN — Medication 15 MILLIGRAM(S): at 15:25

## 2018-05-11 RX ADMIN — Medication 5 MILLIGRAM(S): at 16:00

## 2018-05-11 RX ADMIN — Medication 100 MILLIGRAM(S): at 15:31

## 2018-05-11 RX ADMIN — Medication 15 MILLIGRAM(S): at 15:00

## 2018-05-11 RX ADMIN — OXYCODONE HYDROCHLORIDE 10 MILLIGRAM(S): 5 TABLET ORAL at 22:14

## 2018-05-11 RX ADMIN — CHLORHEXIDINE GLUCONATE 5 MILLILITER(S): 213 SOLUTION TOPICAL at 15:31

## 2018-05-11 RX ADMIN — CHLORHEXIDINE GLUCONATE 1 APPLICATION(S): 213 SOLUTION TOPICAL at 05:18

## 2018-05-11 RX ADMIN — Medication 15 MILLIGRAM(S): at 21:01

## 2018-05-11 RX ADMIN — SODIUM CHLORIDE 20 MILLILITER(S): 9 INJECTION INTRAMUSCULAR; INTRAVENOUS; SUBCUTANEOUS at 14:44

## 2018-05-11 RX ADMIN — Medication 2 PUFF(S): at 15:13

## 2018-05-11 RX ADMIN — Medication 100 MILLIGRAM(S): at 22:15

## 2018-05-11 RX ADMIN — Medication 12.5 MILLIGRAM(S): at 05:18

## 2018-05-11 RX ADMIN — FENTANYL CITRATE 25 MICROGRAM(S): 50 INJECTION INTRAVENOUS at 14:00

## 2018-05-11 RX ADMIN — FAMOTIDINE 20 MILLIGRAM(S): 10 INJECTION INTRAVENOUS at 22:07

## 2018-05-11 RX ADMIN — PROPOFOL 12.11 MICROGRAM(S)/KG/MIN: 10 INJECTION, EMULSION INTRAVENOUS at 14:00

## 2018-05-11 RX ADMIN — Medication 15 MILLIGRAM(S): at 14:00

## 2018-05-11 RX ADMIN — Medication 15 MILLIGRAM(S): at 21:15

## 2018-05-11 RX ADMIN — FENTANYL CITRATE 25 MICROGRAM(S): 50 INJECTION INTRAVENOUS at 17:50

## 2018-05-11 RX ADMIN — CHLORHEXIDINE GLUCONATE 15 MILLILITER(S): 213 SOLUTION TOPICAL at 05:18

## 2018-05-11 RX ADMIN — Medication 100 GRAM(S): at 17:47

## 2018-05-11 RX ADMIN — OXYCODONE HYDROCHLORIDE 10 MILLIGRAM(S): 5 TABLET ORAL at 22:44

## 2018-05-11 RX ADMIN — Medication 100 MILLIEQUIVALENT(S): at 14:00

## 2018-05-11 RX ADMIN — ALBUTEROL 2 PUFF(S): 90 AEROSOL, METERED ORAL at 15:13

## 2018-05-11 RX ADMIN — NICARDIPINE HYDROCHLORIDE 25 MG/HR: 30 CAPSULE, EXTENDED RELEASE ORAL at 14:44

## 2018-05-11 RX ADMIN — Medication 100 MILLIEQUIVALENT(S): at 14:47

## 2018-05-11 RX ADMIN — Medication 15 MILLIGRAM(S): at 14:51

## 2018-05-11 NOTE — BRIEF OPERATIVE NOTE - PRE-OP DX
Angina pectoris  05/11/2018    Active  French Sy  Left main coronary artery disease  05/11/2018    Active  French Sy

## 2018-05-11 NOTE — DISCHARGE NOTE ADULT - PATIENT PORTAL LINK FT
You can access the [facility] Patient Portal, offered by [facility], by registering with the following website: [email or web address]

## 2018-05-11 NOTE — DISCHARGE NOTE ADULT - NS AS ACTIVITY OBS
Walking-Outdoors allowed/Stairs allowed/No Heavy lifting/straining/Do not make important decisions/Do not drive or operate machinery/Showering allowed/Walking-Indoors allowed

## 2018-05-11 NOTE — DISCHARGE NOTE ADULT - MEDICATION SUMMARY - MEDICATIONS TO TAKE
I will START or STAY ON the medications listed below when I get home from the hospital:    oxyCODONE-acetaminophen 5 mg-325 mg oral tablet  -- 1 tab(s) by mouth every 6 hours, As needed, Moderate Pain (4 - 6) MDD:4  -- Indication: For S/p cabg    aspirin 325 mg oral tablet  -- 1 tab(s) by mouth once a day  -- Indication: For CORONARY ARTERY DISEASE INVOLVING NATIVE CORONARY ARTERY OF NATIVE HEART,    atorvastatin 40 mg oral tablet  -- 1 tab(s) by mouth once a day (at bedtime)  -- Indication: For CORONARY ARTERY DISEASE INVOLVING NATIVE CORONARY ARTERY OF NATIVE HEART,    Zovirax 400 mg oral tablet  -- 1 tab(s) by mouth once a day  -- Indication: For Cold sores    metoprolol tartrate 25 mg oral tablet  -- 1 tab(s) by mouth 2 times a day  -- Indication: For CORONARY ARTERY DISEASE INVOLVING NATIVE CORONARY ARTERY OF NATIVE HEART,    guaiFENesin 600 mg oral tablet, extended release  -- 1 tab(s) by mouth every 12 hours  -- Indication: For Cough    famotidine 20 mg oral tablet  -- 1 tab(s) by mouth 2 times a day  -- Indication: For Gi prophylaxis    docusate sodium 100 mg oral capsule  -- 1 cap(s) by mouth 3 times a day  -- Indication: For Constipation I will START or STAY ON the medications listed below when I get home from the hospital:    aspirin 325 mg oral tablet  -- 1 tab(s) by mouth once a day  -- Indication: For Status post coronary artery bypass graft    atorvastatin 40 mg oral tablet  -- 1 tab(s) by mouth once a day (at bedtime)  -- Indication: For Status post coronary artery bypass graft    Zovirax 400 mg oral tablet  -- 1 tab(s) by mouth once a day  -- Indication: For Status post coronary artery bypass graft    metoprolol tartrate 50 mg oral tablet  -- 1 tab(s) by mouth 2 times a day  -- Indication: For Status post coronary artery bypass graft    guaiFENesin 600 mg oral tablet, extended release  -- 1 tab(s) by mouth every 12 hours  -- Indication: For Status post coronary artery bypass graft    famotidine 20 mg oral tablet  -- 1 tab(s) by mouth 2 times a day  -- Indication: For Status post coronary artery bypass graft    docusate sodium 100 mg oral capsule  -- 1 cap(s) by mouth 3 times a day  -- Indication: For Status post coronary artery bypass graft

## 2018-05-11 NOTE — DISCHARGE NOTE ADULT - PLAN OF CARE
less symptoms myocardial revascularization s/p cabg, continue medications as prescribed. follow up with cardiac surgery

## 2018-05-11 NOTE — DISCHARGE NOTE ADULT - CARE PLAN
Principal Discharge DX:	Coronary artery disease involving native coronary artery of native heart, angina presence unspecified  Goal:	less symptoms  Assessment and plan of treatment:	myocardial revascularization Principal Discharge DX:	Coronary artery disease involving native coronary artery of native heart, angina presence unspecified  Goal:	less symptoms  Assessment and plan of treatment:	s/p cabg, continue medications as prescribed. follow up with cardiac surgery

## 2018-05-11 NOTE — PACU DISCHARGE NOTE - COMMENTS
S/P CABG X2  TO CTU VSS W AMBU BAG /10 L O2 /RESPONSIBILITIS IF CARE TRANSFERRED AND ACCEPTED BY CTU NURSE AND ATTENDING

## 2018-05-11 NOTE — DISCHARGE NOTE ADULT - HOSPITAL COURSE
53 yo male presents to ED with anginal symptoms and known left main coronary artery disease. He had recent catheterization 18 that demonstrated advanced CAD. Catheterization was done secondary to + stress test. Primary symptoms of CRAWFORD, easily fatigability and intermittent left chest discomfort/ itching sensation. NO Chest pain now. no diaphoresis. Father  of MI at age 70. He was admitted and medically optimized. On , he underwent CABG x2. His post-op course was 53 yo male presents to ED with anginal symptoms and known left main coronary artery disease. He had recent catheterization 18 that demonstrated advanced CAD. Catheterization was done secondary to + stress test. Primary symptoms of CRAWFORD, easily fatigability and intermittent left chest discomfort/ itching sensation. NO Chest pain now. no diaphoresis. Father  of MI at age 70. He was admitted and medically optimized. On , he underwent CABG x2. Post-operatively, patient had an uneventful hospital course. He was discharged home in stable condition on POD#4 with instructions to follow up with Dr. Sy on 2018 at 12:30pm.

## 2018-05-11 NOTE — CHART NOTE - NSCHARTNOTEFT_GEN_A_CORE
CTUANESTHESIA ADMISSION NOTE      Procedure: Double coronary bypass: LIMA  TO LAD  Ao TO SVG TO OM    Post op diagnosis:  Left main coronary artery disease  Angina pectoris      ___X_  Intubated  TV:___500___      Rate: ___10___      FiO2: ___1___    ____  Patent Airway    ____  Full return of protective reflexes    ____  Full recovery from anesthesia / back to baseline     Vitals:   T: 98.2        R: 10                 BP:  127/61                Sat:       100%            P: 92      Mental Status:  ____ Awake   _____ Alert   _____ Drowsy   __X___ Sedated    Nausea/Vomiting:  __X__ NO  ______Yes,   See Post - Op Orders          Pain Scale (0-10):  _____    Treatment: __X__ None    ____ See Post - Op/PCA Orders    Post - Operative Fluids:   ____ Oral   _X___ See Post - Op Orders    Plan: Discharge:   ____Home       _____Floor     _____Critical Care    X_____  Other:_________________    Comments:

## 2018-05-11 NOTE — DISCHARGE NOTE ADULT - CARE PROVIDER_API CALL
French Sy), Surgery; Thoracic and Cardiac Surgery  [address] [place] [address]  Phone: [phone]  Fax: [phone]

## 2018-05-11 NOTE — BRIEF OPERATIVE NOTE - PROCEDURE
<<-----Click on this checkbox to enter Procedure Double coronary bypass  05/11/2018  LIMA  TO LAD  Ao TO SVG TO OM  Active  FROSELL

## 2018-05-12 LAB
ALBUMIN SERPL ELPH-MCNC: 3.8 G/DL — SIGNIFICANT CHANGE UP (ref 3.5–5.2)
ALP SERPL-CCNC: 38 U/L — SIGNIFICANT CHANGE UP (ref 30–115)
ALT FLD-CCNC: 13 U/L — SIGNIFICANT CHANGE UP (ref 0–41)
ANION GAP SERPL CALC-SCNC: 13 MMOL/L — SIGNIFICANT CHANGE UP (ref 7–14)
APTT BLD: 29.1 SEC — SIGNIFICANT CHANGE UP (ref 27–39.2)
AST SERPL-CCNC: 20 U/L — SIGNIFICANT CHANGE UP (ref 0–41)
BILIRUB DIRECT SERPL-MCNC: 0.2 MG/DL — SIGNIFICANT CHANGE UP (ref 0–0.2)
BILIRUB INDIRECT FLD-MCNC: 0.9 MG/DL — SIGNIFICANT CHANGE UP (ref 0.2–1.2)
BILIRUB SERPL-MCNC: 1.1 MG/DL — SIGNIFICANT CHANGE UP (ref 0.2–1.2)
BUN SERPL-MCNC: 10 MG/DL — SIGNIFICANT CHANGE UP (ref 10–20)
CALCIUM SERPL-MCNC: 7.5 MG/DL — LOW (ref 8.5–10.1)
CHLORIDE SERPL-SCNC: 102 MMOL/L — SIGNIFICANT CHANGE UP (ref 98–110)
CO2 SERPL-SCNC: 20 MMOL/L — SIGNIFICANT CHANGE UP (ref 17–32)
CREAT SERPL-MCNC: 0.7 MG/DL — SIGNIFICANT CHANGE UP (ref 0.7–1.5)
GLUCOSE SERPL-MCNC: 110 MG/DL — HIGH (ref 70–99)
HCT VFR BLD CALC: 30.4 % — LOW (ref 42–52)
HGB BLD-MCNC: 10.8 G/DL — LOW (ref 14–18)
INR BLD: 1.24 RATIO — SIGNIFICANT CHANGE UP (ref 0.65–1.3)
MAGNESIUM SERPL-MCNC: 2.5 MG/DL — HIGH (ref 1.8–2.4)
MCHC RBC-ENTMCNC: 30.3 PG — SIGNIFICANT CHANGE UP (ref 27–31)
MCHC RBC-ENTMCNC: 35.5 G/DL — SIGNIFICANT CHANGE UP (ref 32–37)
MCV RBC AUTO: 85.2 FL — SIGNIFICANT CHANGE UP (ref 80–94)
NRBC # BLD: 0 /100 WBCS — SIGNIFICANT CHANGE UP (ref 0–0)
PLATELET # BLD AUTO: 192 K/UL — SIGNIFICANT CHANGE UP (ref 130–400)
POTASSIUM SERPL-MCNC: 4.6 MMOL/L — SIGNIFICANT CHANGE UP (ref 3.5–5)
POTASSIUM SERPL-SCNC: 4.6 MMOL/L — SIGNIFICANT CHANGE UP (ref 3.5–5)
PROT SERPL-MCNC: 5.3 G/DL — LOW (ref 6–8)
PROTHROM AB SERPL-ACNC: 13.5 SEC — HIGH (ref 9.95–12.87)
RBC # BLD: 3.57 M/UL — LOW (ref 4.7–6.1)
RBC # FLD: 12.3 % — SIGNIFICANT CHANGE UP (ref 11.5–14.5)
SODIUM SERPL-SCNC: 135 MMOL/L — SIGNIFICANT CHANGE UP (ref 135–146)
WBC # BLD: 10.64 K/UL — SIGNIFICANT CHANGE UP (ref 4.8–10.8)
WBC # FLD AUTO: 10.64 K/UL — SIGNIFICANT CHANGE UP (ref 4.8–10.8)

## 2018-05-12 RX ORDER — METOPROLOL TARTRATE 50 MG
12.5 TABLET ORAL
Qty: 0 | Refills: 0 | Status: DISCONTINUED | OUTPATIENT
Start: 2018-05-12 | End: 2018-05-14

## 2018-05-12 RX ORDER — DEXTROSE 50 % IN WATER 50 %
25 SYRINGE (ML) INTRAVENOUS ONCE
Qty: 0 | Refills: 0 | Status: DISCONTINUED | OUTPATIENT
Start: 2018-05-12 | End: 2018-05-14

## 2018-05-12 RX ORDER — GLUCAGON INJECTION, SOLUTION 0.5 MG/.1ML
1 INJECTION, SOLUTION SUBCUTANEOUS ONCE
Qty: 0 | Refills: 0 | Status: DISCONTINUED | OUTPATIENT
Start: 2018-05-12 | End: 2018-05-14

## 2018-05-12 RX ORDER — INSULIN LISPRO 100/ML
VIAL (ML) SUBCUTANEOUS
Qty: 0 | Refills: 0 | Status: DISCONTINUED | OUTPATIENT
Start: 2018-05-12 | End: 2018-05-14

## 2018-05-12 RX ORDER — DEXTROSE 50 % IN WATER 50 %
25 SYRINGE (ML) INTRAVENOUS ONCE
Qty: 0 | Refills: 0 | Status: DISCONTINUED | OUTPATIENT
Start: 2018-05-12 | End: 2018-05-16

## 2018-05-12 RX ORDER — ALBUMIN HUMAN 25 %
1000 VIAL (ML) INTRAVENOUS ONCE
Qty: 0 | Refills: 0 | Status: COMPLETED | OUTPATIENT
Start: 2018-05-12 | End: 2018-05-12

## 2018-05-12 RX ORDER — FAMOTIDINE 10 MG/ML
20 INJECTION INTRAVENOUS
Qty: 0 | Refills: 0 | Status: DISCONTINUED | OUTPATIENT
Start: 2018-05-12 | End: 2018-05-16

## 2018-05-12 RX ORDER — IPRATROPIUM/ALBUTEROL SULFATE 18-103MCG
3 AEROSOL WITH ADAPTER (GRAM) INHALATION EVERY 6 HOURS
Qty: 0 | Refills: 0 | Status: DISCONTINUED | OUTPATIENT
Start: 2018-05-12 | End: 2018-05-16

## 2018-05-12 RX ORDER — DEXTROSE 50 % IN WATER 50 %
12.5 SYRINGE (ML) INTRAVENOUS ONCE
Qty: 0 | Refills: 0 | Status: DISCONTINUED | OUTPATIENT
Start: 2018-05-12 | End: 2018-05-16

## 2018-05-12 RX ORDER — ACYCLOVIR SODIUM 500 MG
400 VIAL (EA) INTRAVENOUS DAILY
Qty: 0 | Refills: 0 | Status: DISCONTINUED | OUTPATIENT
Start: 2018-05-12 | End: 2018-05-16

## 2018-05-12 RX ORDER — HEPARIN SODIUM 5000 [USP'U]/ML
5000 INJECTION INTRAVENOUS; SUBCUTANEOUS EVERY 8 HOURS
Qty: 0 | Refills: 0 | Status: DISCONTINUED | OUTPATIENT
Start: 2018-05-12 | End: 2018-05-13

## 2018-05-12 RX ORDER — SODIUM CHLORIDE 9 MG/ML
1000 INJECTION INTRAMUSCULAR; INTRAVENOUS; SUBCUTANEOUS ONCE
Qty: 0 | Refills: 0 | Status: DISCONTINUED | OUTPATIENT
Start: 2018-05-12 | End: 2018-05-12

## 2018-05-12 RX ORDER — ASPIRIN/CALCIUM CARB/MAGNESIUM 324 MG
325 TABLET ORAL DAILY
Qty: 0 | Refills: 0 | Status: DISCONTINUED | OUTPATIENT
Start: 2018-05-12 | End: 2018-05-16

## 2018-05-12 RX ORDER — ATORVASTATIN CALCIUM 80 MG/1
40 TABLET, FILM COATED ORAL AT BEDTIME
Qty: 0 | Refills: 0 | Status: DISCONTINUED | OUTPATIENT
Start: 2018-05-12 | End: 2018-05-16

## 2018-05-12 RX ORDER — ACYCLOVIR SODIUM 500 MG
800 VIAL (EA) INTRAVENOUS ONCE
Qty: 0 | Refills: 0 | Status: COMPLETED | OUTPATIENT
Start: 2018-05-12 | End: 2018-05-12

## 2018-05-12 RX ORDER — ALBUMIN HUMAN 25 %
500 VIAL (ML) INTRAVENOUS ONCE
Qty: 0 | Refills: 0 | Status: COMPLETED | OUTPATIENT
Start: 2018-05-12 | End: 2018-05-12

## 2018-05-12 RX ORDER — SODIUM CHLORIDE 9 MG/ML
1000 INJECTION, SOLUTION INTRAVENOUS
Qty: 0 | Refills: 0 | Status: DISCONTINUED | OUTPATIENT
Start: 2018-05-12 | End: 2018-05-14

## 2018-05-12 RX ORDER — DEXTROSE 50 % IN WATER 50 %
15 SYRINGE (ML) INTRAVENOUS ONCE
Qty: 0 | Refills: 0 | Status: DISCONTINUED | OUTPATIENT
Start: 2018-05-12 | End: 2018-05-14

## 2018-05-12 RX ADMIN — Medication 800 MILLIGRAM(S): at 18:03

## 2018-05-12 RX ADMIN — Medication 100 MILLIGRAM(S): at 22:16

## 2018-05-12 RX ADMIN — Medication 100 MILLIGRAM(S): at 09:01

## 2018-05-12 RX ADMIN — OXYCODONE HYDROCHLORIDE 10 MILLIGRAM(S): 5 TABLET ORAL at 22:20

## 2018-05-12 RX ADMIN — HEPARIN SODIUM 5000 UNIT(S): 5000 INJECTION INTRAVENOUS; SUBCUTANEOUS at 22:16

## 2018-05-12 RX ADMIN — Medication 325 MILLIGRAM(S): at 11:58

## 2018-05-12 RX ADMIN — Medication 400 MILLIGRAM(S): at 18:00

## 2018-05-12 RX ADMIN — Medication 500 MILLILITER(S): at 12:01

## 2018-05-12 RX ADMIN — Medication 600 MILLIGRAM(S): at 06:14

## 2018-05-12 RX ADMIN — Medication 250 MILLILITER(S): at 05:00

## 2018-05-12 RX ADMIN — Medication 100 MILLIGRAM(S): at 02:00

## 2018-05-12 RX ADMIN — ATORVASTATIN CALCIUM 40 MILLIGRAM(S): 80 TABLET, FILM COATED ORAL at 22:15

## 2018-05-12 RX ADMIN — FAMOTIDINE 20 MILLIGRAM(S): 10 INJECTION INTRAVENOUS at 06:55

## 2018-05-12 RX ADMIN — Medication 15 MILLIGRAM(S): at 12:20

## 2018-05-12 RX ADMIN — Medication 15 MILLIGRAM(S): at 03:00

## 2018-05-12 RX ADMIN — OXYCODONE HYDROCHLORIDE 10 MILLIGRAM(S): 5 TABLET ORAL at 22:15

## 2018-05-12 RX ADMIN — Medication 15 MILLIGRAM(S): at 03:25

## 2018-05-12 RX ADMIN — Medication 12.5 MILLIGRAM(S): at 11:59

## 2018-05-12 RX ADMIN — FAMOTIDINE 20 MILLIGRAM(S): 10 INJECTION INTRAVENOUS at 17:59

## 2018-05-12 RX ADMIN — OXYCODONE HYDROCHLORIDE 10 MILLIGRAM(S): 5 TABLET ORAL at 09:01

## 2018-05-12 RX ADMIN — Medication 12.5 MILLIGRAM(S): at 17:59

## 2018-05-12 RX ADMIN — ALBUTEROL 2 PUFF(S): 90 AEROSOL, METERED ORAL at 10:39

## 2018-05-12 RX ADMIN — Medication 100 MILLIGRAM(S): at 15:16

## 2018-05-12 RX ADMIN — Medication 2 PUFF(S): at 10:39

## 2018-05-12 RX ADMIN — Medication 15 MILLIGRAM(S): at 11:51

## 2018-05-12 RX ADMIN — OXYCODONE HYDROCHLORIDE 10 MILLIGRAM(S): 5 TABLET ORAL at 03:55

## 2018-05-12 RX ADMIN — Medication 100 GRAM(S): at 06:14

## 2018-05-12 RX ADMIN — OXYCODONE HYDROCHLORIDE 10 MILLIGRAM(S): 5 TABLET ORAL at 09:31

## 2018-05-12 RX ADMIN — HEPARIN SODIUM 5000 UNIT(S): 5000 INJECTION INTRAVENOUS; SUBCUTANEOUS at 15:16

## 2018-05-12 RX ADMIN — Medication 600 MILLIGRAM(S): at 17:59

## 2018-05-12 RX ADMIN — Medication 100 MILLIGRAM(S): at 06:15

## 2018-05-12 RX ADMIN — OXYCODONE HYDROCHLORIDE 10 MILLIGRAM(S): 5 TABLET ORAL at 04:25

## 2018-05-12 NOTE — PROGRESS NOTE ADULT - SUBJECTIVE AND OBJECTIVE BOX
MIGUEL FRAUSTO  MRN#: [id number]  Subjective:  Patient was seen and evalauted on AM rounds offerring no specific compliants at this time.    OBJECTIVE:  ICU Vital Signs Last 24 Hrs  T(C): 37 (12 May 2018 10:00), Max: 38.1 (11 May 2018 21:00)  T(F): 98.6 (12 May 2018 10:00), Max: 100.6 (11 May 2018 21:00)  HR: 100 (12 May 2018 15:14) (84 - 102)  BP: 102/65 (12 May 2018 15:14) (100/66 - 102/65)  BP(mean): 75 (12 May 2018 15:14) (75 - 75)  ABP: 128/62 (12 May 2018 10:24) (104/60 - 128/68)  ABP(mean): 82 (12 May 2018 10:24) (72 - 86)  RR: 13 (12 May 2018 10:24) (0 - 24)  SpO2: 98% (12 May 2018 10:24) (94% - 100%)      11 @ 07:  -   @ 07:00  --------------------------------------------------------  IN: 2992.6 mL / OUT: 1891 mL / NET: 1101.6 mL     @ 07:12 @ 17:31  --------------------------------------------------------  IN: 569 mL / OUT: 399 mL / NET: 170 mL      CAPILLARY BLOOD GLUCOSE  109 (12 May 2018 05:00)          PHYSICAL EXAM:Daily     Daily Weight in k.3 (12 May 2018 06:00)  General: WN/WD NAD    HEENT:     + NCAT  + EOMI  - Conjuctival edema   - Icterus   - Thrush   - ETT  - NGT/OGT    Neck:         + FROM    - JVD     - Nodes     - Masses    + Mid-line trachea   - Tracheostomy    Chest:         - Sternal click  - Sternal drainage  + Pacing wires  + Chest tubes  - SubQ emphysema    Lungs:          + CTA   - Rhonchi    - Rales    - Wheezing     - Decreased BS   - Dullness R L    Cardiac:       + S1 + S2    + RRR   - Irregular   - S3  - S4    - Murmurs   - Rub   - Hamman’s sign     Abdomen:    + BS     + Soft    + Non-tender     - Distended    - Organomegaly  - PEG    Extremities:   - Cyanosis U/L   - Clubbing  U/L  + LE Edema   + Capillary refill    + Pulses     Neuro:        + Awake   +  Alert   - Confused   - Lethargic   - Sedated   - Generalized Weakness    Skin:        - Rashes    - Erythema   + Normal incisions   + IV sites intact  - Sacral decubitus    HOSPITAL MEDICATIONS:  MEDICATIONS  (STANDING):  acyclovir   Tablet 800 milliGRAM(s) Oral once  acyclovir   Tablet 400 milliGRAM(s) Oral daily  aspirin 325 milliGRAM(s) Oral daily  atorvastatin 40 milliGRAM(s) Oral at bedtime  dextrose 5%. 1000 milliLiter(s) (50 mL/Hr) IV Continuous <Continuous>  dextrose 50% Injectable 12.5 Gram(s) IV Push once  dextrose 50% Injectable 25 Gram(s) IV Push once  dextrose 50% Injectable 25 Gram(s) IV Push once  docusate sodium 100 milliGRAM(s) Oral three times a day  famotidine    Tablet 20 milliGRAM(s) Oral two times a day  guaiFENesin  milliGRAM(s) Oral every 12 hours  heparin  Injectable 5000 Unit(s) SubCutaneous every 8 hours  insulin lispro (HumaLOG) corrective regimen sliding scale   SubCutaneous three times a day before meals  magnesium sulfate  IVPB 1 Gram(s) IV Intermittent every 12 hours  metoprolol tartrate 12.5 milliGRAM(s) Oral two times a day    MEDICATIONS  (PRN):  ALBUTerol/ipratropium for Nebulization 3 milliLiter(s) Nebulizer every 6 hours PRN Shortness of Breath and/or Wheezing  dextrose 40% Gel 15 Gram(s) Oral once PRN Blood Glucose LESS THAN 70 milliGRAM(s)/deciliter  glucagon  Injectable 1 milliGRAM(s) IntraMuscular once PRN Glucose LESS THAN 70 milligrams/deciliter  ondansetron  IVPB 4 milliGRAM(s) IV Intermittent once PRN Nausea and/or Vomiting  oxyCODONE    IR 10 milliGRAM(s) Oral every 4 hours PRN Severe Pain (7 - 10)  oxyCODONE    IR 5 milliGRAM(s) Oral every 4 hours PRN Moderate Pain (4 - 6)      LABS:                        10.8   10.64 )-----------( 192      ( 12 May 2018 02:24 )             30.4    05-12    135  |  102  |  10  ----------------------------<  110<H>  4.6   |  20  |  0.7    Ca    7.5<L>      12 May 2018 02:24  Phos  2.8     05-11  Mg     2.5     -12    TPro  5.3<L>  /  Alb  3.8  /  TBili  1.1  /  DBili  0.2  /  AST  20  /  ALT  13  /  AlkPhos  38  05-12    PT/INR - ( 12 May 2018 02:24 )   PT: 13.50 sec;   INR: 1.24 ratio         PTT - ( 12 May 2018 02:24 )  PTT:29.1 sec LIVER FUNCTIONS - ( 12 May 2018 02:24 )  Alb: 3.8 g/dL / Pro: 5.3 g/dL / ALK PHOS: 38 U/L / ALT: 13 U/L / AST: 20 U/L / GGT: x               RADIOLOGY:  X Reviewed and interpreted by me: bibasilar opacities    CARDIOPULMONARY DYSFUNCTION  - Respiratory status required supplemental oxygen & the following of continuous pulse oximetry for support & to prevent decompensation  - Continued early mobilization as tolerated  - Addressed analgesic regimen to optimize function    PREVENTION-PROPHYLAXIS  - ASA continued for graft occlusion-thromboembolism prophylaxis  - Lipitor/Zocor was also started for long term graft patency  - Lovenox/Heparin initiated/continued for VTE prophylaxis in addition to Venodyne boots  - Protonix/Pepcid maintained for GI bleeding prophylaxis  - Lopressor initiated/continued for atrial fibrillation prophylaxis  - Metabolic stability & infection prophylaxis required review and adjustment of regular Insulin sliding scale and gylcemic regimen while following serial glucose levels to help achieve & maintain euglycemia  - Reviewed & addressed surgical site infection prophylaxis regimen

## 2018-05-12 NOTE — PROVIDER CONTACT NOTE (OTHER) - SITUATION
Levophed  started to keep  -120. No improvement in urine output. Cardiac index 2.8  HR 90's - low 100's. Negative fluid balance 486 cc since OR.

## 2018-05-12 NOTE — PROGRESS NOTE ADULT - SUBJECTIVE AND OBJECTIVE BOX
OPERATIVE PROCEDURE(s):                POD #                       52yMale  SURGEON(s): SHAISTA Chase  SUBJECTIVE ASSESSMENT:   Vital Signs Last 24 Hrs  T(F): 98.6 (12 May 2018 10:00), Max: 100.6 (11 May 2018 21:00)  HR: 88 (12 May 2018 10:24) (84 - 108) SR  BP: 100/66 (11 May 2018 20:00) (96/61 - 100/66)  BP(mean): 75 (11 May 2018 20:00) (71 - 75)  ABP: 128/62 (12 May 2018 10:24) (104/60 - 138/68)  ABP(mean): 82 (12 May 2018 10:24)  RR: 13 (12 May 2018 10:24) (0 - 25)  SpO2: 98% (12 May 2018 10:24) (84% - 100%) 2LNC  CVP(mm Hg): 11 (12 May 2018 10:24)  CVP(cm H2O): --  CO: 6 (12 May 2018 10:24)  CI: 3.2 (12 May 2018 10:24)  PA: 33/16 (12 May 2018 10:24)  SVR: 945 (12 May 2018 10:24)  Mode: standby    I&O's Detail    11 May 2018 07:  -  12 May 2018 07:00  --------------------------------------------------------  IN:    Albumin 5%  - 500 mL: 500 mL    insulin Infusion: 30 mL    IV PiggyBack: 1450 mL    niCARdipine Infusion: 160 mL    nitroglycerin  Infusion: 48.6 mL    norepinephrine Infusion: 49 mL    Oral Fluid: 350 mL    propofol Infusion: 25 mL    sodium chloride 0.9%.: 380 mL  Total IN: 2992.6 mL    OUT:    Chest Tube: 240 mL    Chest Tube: 310 mL    Indwelling Catheter - Urethral: 1341 mL  Total OUT: 1891 mL        Net:   I&O's Detail    10 May 2018 07:01  -  11 May 2018 07:00  --------------------------------------------------------  Total NET: 683 mL      11 May 2018 07:01  -  12 May 2018 07:00  --------------------------------------------------------  Total NET: 1101.6 mL        CAPILLARY BLOOD GLUCOSE  109 (12 May 2018 05:00)  117 (12 May 2018 02:00)  119 (12 May 2018 01:00)  132 (11 May 2018 23:15)  137 (11 May 2018 22:15)  141 (11 May 2018 21:15)  123 (11 May 2018 20:15)  125 (11 May 2018 17:52)  139 (11 May 2018 15:45)  144 (11 May 2018 15:00)  91 (11 May 2018 13:45)  102 (11 May 2018 06:10)    Hemoglobin A1C with Mean Plasma Glucose (05.10.18 @ 07:11)    Hemoglobin A1C, Whole Blood: 5.8 %    Mean Plasma Glucose: 120 mg/dL        Physical Exam:  General: NAD; A&Ox3/Patient is intubated and sedated  Cardiac: S1/S2, RRR, no murmur, no rubs  Lungs: unlabored respirations, CTA b/l, no wheeze, no rales, no crackles  Abdomen: Soft/NT/ND; positive bowel sounds x 4  Sternum: Intact, no click, incision healing well with no drainage  Incisions: Incisions clean/dry/intact  Extremities: No edema b/l lower extremities; good capillary refill; no cyanosis; palpable 1+ pedal pulses b/l    Central Venous Catheter: Yes[]  No[] , If Yes indication:           Day #  Kaba Catheter: Yes  [] , No  [] , If yes indication:                      Day #  NGT: Yes [] No [] ,    If Yes Placement:                                     Day #  EPICARDIAL WIRES:  [] YES [] NO                                              Day #  BOWEL MOVEMENT:  [] YES [] NO, If No, Timing since last BM:      Day #  CHEST TUBE(Left/Right):  [] YES [] NO, If yes -  AIR LEAKS:  [] YES [] NO        LABS:                        10.8<L>  10.64 )-----------( 192      ( 12 May 2018 02:24 )             30.4<L>                        10.9<L>  11.17<H> )-----------( 139      ( 11 May 2018 13:34 )             30.3<L>    0512    135  |  102  |  10  ----------------------------<  110<H>  4.6   |  20  |  0.7  11    141  |  105  |  10  ----------------------------<  94  3.8   |  21  |  0.7    Ca    7.5<L>      12 May 2018 02:24  Phos  2.8       Mg     2.5     12    TPro  5.3<L> [6.0 - 8.0]  /  Alb  3.8 [3.5 - 5.2]  /  TBili  1.1 [0.2 - 1.2]  /  DBili  0.2 [0.0 - 0.2]  /  AST  20 [0 - 41]  /  ALT  13 [0 - 41]  /  AlkPhos  38 [30 - 115]  12    PT/INR - ( 12 May 2018 02:24 )   PT: ;   INR: 1.24 ratio         PT/INR - ( 11 May 2018 13:34 )   PT: ;   INR: 1.26 ratio         PTT - ( 12 May 2018 02:24 )  PTT:29.1 sec, PTT - ( 11 May 2018 13:34 )  PTT:26.1 sec  Urinalysis Basic - ( 10 May 2018 07:11 )    Color: Yellow / Appearance: Clear / S.015 / pH: x  Gluc: x / Ketone: Negative  / Bili: Negative / Urobili: 0.2 mg/dL   Blood: x / Protein: Negative mg/dL / Nitrite: Negative   Leuk Esterase: Negative / RBC: x / WBC x   Sq Epi: x / Non Sq Epi: x / Bacteria: x      ABG - ( 12 May 2018 04:38 )  pH: 7.35  /  pCO2: 41    /  pO2: 140   / HCO3: 23    / Base Excess: -2.6  /  SaO2: 99    /  LA: 0.5              RADIOLOGY & ADDITIONAL TESTS:  CXR:  EK Lead ECG:   Systolic  mmHg    Diastolic BP 57 mmHg    Ventricular Rate 160 BPM    Atrial Rate 99 BPM    QRS Duration 142 ms    Q-T Interval 242 ms    QTC Calculation(Bezet) 395 ms    R Axis -14 degrees    T Axis 263 degrees    Diagnosis Line Technically poor tracing  Baseline artifact  Regular sinus rhythm  Early transition  Low voltage QRS limb leads  Nonspecific T wave abnormality minor  Confirmed by SEBAS WILLS MD ([id number]) on 2018 5:02:25 PM (18 @ 13:53)    MEDICATIONS  (STANDING):  ALBUTerol    90 MICROgram(s) HFA Inhaler 2 Puff(s) Inhalation every 6 hours  chlorhexidine 0.12% Liquid 5 milliLiter(s) Swish and Spit every 4 hours  docusate sodium 100 milliGRAM(s) Oral three times a day  famotidine Injectable 20 milliGRAM(s) IV Push every 12 hours  guaiFENesin  milliGRAM(s) Oral every 12 hours  insulin Infusion 1 Unit(s)/Hr (1 mL/Hr) IV Continuous <Continuous>  ipratropium 17 MICROgram(s) HFA Inhaler 2 Puff(s) Inhalation every 6 hours  magnesium sulfate  IVPB 1 Gram(s) IV Intermittent every 12 hours  meperidine     Injectable 25 milliGRAM(s) IV Push once  niCARdipine Infusion 5 mG/Hr (25 mL/Hr) IV Continuous <Continuous>  nitroglycerin  Infusion 5 MICROgram(s)/Min (1.5 mL/Hr) IV Continuous <Continuous>  norepinephrine Infusion 0.05 MICROgram(s)/kG/Min (3.783 mL/Hr) IV Continuous <Continuous>  propofol Infusion 25 MICROgram(s)/kG/Min (12.105 mL/Hr) IV Continuous <Continuous>  sodium chloride 0.9% Bolus 1000 milliLiter(s) IV Bolus once  sodium chloride 0.9%. 1000 milliLiter(s) (20 mL/Hr) IV Continuous <Continuous>    MEDICATIONS  (PRN):  ketorolac   Injectable 15 milliGRAM(s) IV Push every 6 hours PRN Moderate Pain (4 - 6)  ondansetron  IVPB 4 milliGRAM(s) IV Intermittent once PRN Nausea and/or Vomiting  oxyCODONE    IR 10 milliGRAM(s) Oral every 4 hours PRN Severe Pain (7 - 10)  oxyCODONE    IR 5 milliGRAM(s) Oral every 4 hours PRN Moderate Pain (4 - 6)    HEPARIN:  [] YES [] NO  Dose: XX UNITS/HR UNITS Q8H  LOVENOX:[] YES [] NO  Dose: XX mg Q24H  COUMADIN: []  YES [] NO  Dose: XX mg  Q24H  SCD's: YES b/l  GI Prophylaxis: Protonix [], Pepcid [], None [], (Contra-indication:.....)    Post-Op Beta-Blockers: Yes [], No[], If No, then contraindication:  Post-Op Aspirin: Yes [],  No [], If No, then contraindication:  Post-Op Statin: Yes [], No[], If No, then contraindication:  Allergies    No Known Allergies    Intolerances      Ambulation/Activity Status:    Assessment/Plan:  52y Male status-post .....  - Case and plan discussed with CTU Intensivist and CT Surgeon - Dr. Maradiaga/Kerrie/Rena   - Continue CTU supportive care    - Continue DVT/GI prophylaxis  - Incentive Spirometry 10 times an hour  - Continue to advance physical activity as tolerated and continue PT/OT as directed  1. CAD: Continue ASA, statin, BB  2. HTN:   3. A. Fib:   4. COPD/Hypoxia:   5. DM/Glucose Control:     Social Service Disposition: OPERATIVE PROCEDURE(s):      CABG x2          POD #  1                     52yMale  SURGEON(s): SHAISTA Chase  SUBJECTIVE ASSESSMENT: No complaints  Vital Signs Last 24 Hrs  T(F): 98.6 (12 May 2018 10:00), Max: 100.6 (11 May 2018 21:00)  HR: 88 (12 May 2018 10:24) (84 - 108) SR  BP: 100/66 (11 May 2018 20:00) (96/61 - 100/66)  BP(mean): 75 (11 May 2018 20:00) (71 - 75)  ABP: 128/62 (12 May 2018 10:24) (104/60 - 138/68)  ABP(mean): 82 (12 May 2018 10:24)  RR: 13 (12 May 2018 10:24) (0 - 25)  SpO2: 98% (12 May 2018 10:24) (84% - 100%) 2LNC  CVP(mm Hg): 11 (12 May 2018 10:24)  CVP(cm H2O): --  CO: 6 (12 May 2018 10:24)  CI: 3.2 (12 May 2018 10:24)  PA: 33/16 (12 May 2018 10:24)  SVR: 945 (12 May 2018 10:24)  Mode: standby    I&O's Detail    11 May 2018 07:  -  12 May 2018 07:00  --------------------------------------------------------  IN:    Albumin 5%  - 500 mL: 500 mL    insulin Infusion: 30 mL    IV PiggyBack: 1450 mL    niCARdipine Infusion: 160 mL    nitroglycerin  Infusion: 48.6 mL    norepinephrine Infusion: 49 mL    Oral Fluid: 350 mL    propofol Infusion: 25 mL    sodium chloride 0.9%.: 380 mL  Total IN: 2992.6 mL    OUT:    Chest Tube: 240 mL    Chest Tube: 310 mL    Indwelling Catheter - Urethral: 1341 mL  Total OUT: 1891 mL        Net:   I&O's Detail    10 May 2018 07:01  -  11 May 2018 07:00  --------------------------------------------------------  Total NET: 683 mL      11 May 2018 07:01  -  12 May 2018 07:00  --------------------------------------------------------  Total NET: 1101.6 mL        CAPILLARY BLOOD GLUCOSE  109 (12 May 2018 05:00)  117 (12 May 2018 02:00)  119 (12 May 2018 01:00)  132 (11 May 2018 23:15)  137 (11 May 2018 22:15)  141 (11 May 2018 21:15)  123 (11 May 2018 20:15)  125 (11 May 2018 17:52)  139 (11 May 2018 15:45)  144 (11 May 2018 15:00)  91 (11 May 2018 13:45)  102 (11 May 2018 06:10)    Hemoglobin A1C with Mean Plasma Glucose (05.10.18 @ 07:11)    Hemoglobin A1C, Whole Blood: 5.8 %    Mean Plasma Glucose: 120 mg/dL        Physical Exam:  General: NAD; A&Ox3  Cardiac: S1/S2, RRR, no murmur, no rubs  Lungs: unlabored respirations, CTA b/l, no wheeze, no rales, no crackles  Abdomen: Soft/NT/ND; positive bowel sounds x 4  Sternum: Intact, no click, incision healing well with no drainage  Incisions: Incisions clean/dry/intact  Extremities: No edema b/l lower extremities; good capillary refill; no cyanosis; palpable 1+ pedal pulses b/l    Central Venous Catheter: Yes[x]  No[] , If Yes indication:           Day #1  Isidro Catheter: Yes  [x] , No  [] , If yes indication:                      Day #1  NGT: Yes [] No [x] ,    If Yes Placement:                                     Day #  EPICARDIAL WIRES:  [] YES [x] NO                                              Day #  BOWEL MOVEMENT:  [] YES [x] NO, If No, Timing since last BM:      Day #1  CHEST TUBE(Left/Right):  [x] YES [] NO, If yes -  AIR LEAKS:  [] YES [x] NO        LABS:                        10.8<L>  10.64 )-----------( 192      ( 12 May 2018 02:24 )             30.4<L>                        10.9<L>  11.17<H> )-----------( 139      ( 11 May 2018 13:34 )             30.3<L>    0512    135  |  102  |  10  ----------------------------<  110<H>  4.6   |  20  |  0.7  11    141  |  105  |  10  ----------------------------<  94  3.8   |  21  |  0.7    Ca    7.5<L>      12 May 2018 02:24  Phos  2.8       Mg     2.5     12    TPro  5.3<L> [6.0 - 8.0]  /  Alb  3.8 [3.5 - 5.2]  /  TBili  1.1 [0.2 - 1.2]  /  DBili  0.2 [0.0 - 0.2]  /  AST  20 [0 - 41]  /  ALT  13 [0 - 41]  /  AlkPhos  38 [30 - 115]  12    PT/INR - ( 12 May 2018 02:24 )   PT: ;   INR: 1.24 ratio         PT/INR - ( 11 May 2018 13:34 )   PT: ;   INR: 1.26 ratio         PTT - ( 12 May 2018 02:24 )  PTT:29.1 sec, PTT - ( 11 May 2018 13:34 )  PTT:26.1 sec  Urinalysis Basic - ( 10 May 2018 07:11 )    Color: Yellow / Appearance: Clear / S.015 / pH: x  Gluc: x / Ketone: Negative  / Bili: Negative / Urobili: 0.2 mg/dL   Blood: x / Protein: Negative mg/dL / Nitrite: Negative   Leuk Esterase: Negative / RBC: x / WBC x   Sq Epi: x / Non Sq Epi: x / Bacteria: x      ABG - ( 12 May 2018 04:38 )  pH: 7.35  /  pCO2: 41    /  pO2: 140   / HCO3: 23    / Base Excess: -2.6  /  SaO2: 99    /  LA: 0.5              RADIOLOGY & ADDITIONAL TESTS:  CXR:  Status post a median sternotomy.    Hazy opacity overlies both lung bases.    EK Lead ECG:   Systolic  mmHg    Diastolic BP 57 mmHg    Ventricular Rate 160 BPM    Atrial Rate 99 BPM    QRS Duration 142 ms    Q-T Interval 242 ms    QTC Calculation(Bezet) 395 ms    R Axis -14 degrees    T Axis 263 degrees    Diagnosis Line Technically poor tracing  Baseline artifact  Regular sinus rhythm  Early transition  Low voltage QRS limb leads  Nonspecific T wave abnormality minor  Confirmed by SEBAS WILLS MD ([id number]) on 2018 5:02:25 PM (18 @ 13:53)    MEDICATIONS  (STANDING):  ALBUTerol    90 MICROgram(s) HFA Inhaler 2 Puff(s) Inhalation every 6 hours  chlorhexidine 0.12% Liquid 5 milliLiter(s) Swish and Spit every 4 hours  docusate sodium 100 milliGRAM(s) Oral three times a day  famotidine Injectable 20 milliGRAM(s) IV Push every 12 hours  guaiFENesin  milliGRAM(s) Oral every 12 hours  insulin Infusion 1 Unit(s)/Hr (1 mL/Hr) IV Continuous <Continuous>  ipratropium 17 MICROgram(s) HFA Inhaler 2 Puff(s) Inhalation every 6 hours  magnesium sulfate  IVPB 1 Gram(s) IV Intermittent every 12 hours  meperidine     Injectable 25 milliGRAM(s) IV Push once  niCARdipine Infusion 5 mG/Hr (25 mL/Hr) IV Continuous <Continuous>  nitroglycerin  Infusion 5 MICROgram(s)/Min (1.5 mL/Hr) IV Continuous <Continuous>  norepinephrine Infusion 0.05 MICROgram(s)/kG/Min (3.783 mL/Hr) IV Continuous <Continuous>  propofol Infusion 25 MICROgram(s)/kG/Min (12.105 mL/Hr) IV Continuous <Continuous>  sodium chloride 0.9% Bolus 1000 milliLiter(s) IV Bolus once  sodium chloride 0.9%. 1000 milliLiter(s) (20 mL/Hr) IV Continuous <Continuous>    MEDICATIONS  (PRN):  ketorolac   Injectable 15 milliGRAM(s) IV Push every 6 hours PRN Moderate Pain (4 - 6)  ondansetron  IVPB 4 milliGRAM(s) IV Intermittent once PRN Nausea and/or Vomiting  oxyCODONE    IR 10 milliGRAM(s) Oral every 4 hours PRN Severe Pain (7 - 10)  oxyCODONE    IR 5 milliGRAM(s) Oral every 4 hours PRN Moderate Pain (4 - 6)    HEPARIN:  [] YES [x] NO  Dose: XX UNITS/HR UNITS Q8H   LOVENOX:[] YES [x] NO  Dose: XX mg Q24H  COUMADIN: []  YES [x] NO  Dose: XX mg  Q24H  Start hep sq for dvt proph today  SCD's: YES b/l  GI Prophylaxis: Protonix [], Pepcid [x], None [], (Contra-indication:.....)    Post-Op Beta-Blockers: Yes [x], No[], If No, then contraindication:  Post-Op Aspirin: Yes [x],  No [], If No, then contraindication:  Post-Op Statin: Yes [x], No[], If No, then contraindication:  Allergies    No Known Allergies    Intolerances      Ambulation/Activity Status: OOB to ambulate today    Assessment/Plan:  52y Male status-post CABG  - Case and plan discussed with CTU Intensivist and CT Surgeon - Dr. Maradiaga/Kerrie/Rena   - Continue CTU supportive care    - Continue DVT/GI prophylaxis  - Incentive Spirometry 10 times an hour  - Continue to advance physical activity as tolerated and continue PT/OT as directed  1. CAD: Start ASA, statin BB  2. d/c swan, a-line, isidro, mediastinal sump

## 2018-05-13 DIAGNOSIS — R73.9 HYPERGLYCEMIA, UNSPECIFIED: ICD-10-CM

## 2018-05-13 DIAGNOSIS — D50.0 IRON DEFICIENCY ANEMIA SECONDARY TO BLOOD LOSS (CHRONIC): ICD-10-CM

## 2018-05-13 DIAGNOSIS — Z95.1 PRESENCE OF AORTOCORONARY BYPASS GRAFT: ICD-10-CM

## 2018-05-13 LAB
ALBUMIN SERPL ELPH-MCNC: 4.3 G/DL — SIGNIFICANT CHANGE UP (ref 3.5–5.2)
ALP SERPL-CCNC: 39 U/L — SIGNIFICANT CHANGE UP (ref 30–115)
ALT FLD-CCNC: 14 U/L — SIGNIFICANT CHANGE UP (ref 0–41)
ANION GAP SERPL CALC-SCNC: 9 MMOL/L — SIGNIFICANT CHANGE UP (ref 7–14)
AST SERPL-CCNC: 17 U/L — SIGNIFICANT CHANGE UP (ref 0–41)
BILIRUB DIRECT SERPL-MCNC: 0.2 MG/DL — SIGNIFICANT CHANGE UP (ref 0–0.2)
BILIRUB INDIRECT FLD-MCNC: 1 MG/DL — SIGNIFICANT CHANGE UP (ref 0.2–1.2)
BILIRUB SERPL-MCNC: 1.2 MG/DL — SIGNIFICANT CHANGE UP (ref 0.2–1.2)
BUN SERPL-MCNC: 10 MG/DL — SIGNIFICANT CHANGE UP (ref 10–20)
CALCIUM SERPL-MCNC: 7.6 MG/DL — LOW (ref 8.5–10.1)
CHLORIDE SERPL-SCNC: 97 MMOL/L — LOW (ref 98–110)
CO2 SERPL-SCNC: 24 MMOL/L — SIGNIFICANT CHANGE UP (ref 17–32)
CREAT SERPL-MCNC: 0.8 MG/DL — SIGNIFICANT CHANGE UP (ref 0.7–1.5)
GLUCOSE SERPL-MCNC: 124 MG/DL — HIGH (ref 70–99)
HCT VFR BLD CALC: 27.4 % — LOW (ref 42–52)
HGB BLD-MCNC: 9.5 G/DL — LOW (ref 14–18)
MAGNESIUM SERPL-MCNC: 2.6 MG/DL — HIGH (ref 1.8–2.4)
MCHC RBC-ENTMCNC: 30.2 PG — SIGNIFICANT CHANGE UP (ref 27–31)
MCHC RBC-ENTMCNC: 34.7 G/DL — SIGNIFICANT CHANGE UP (ref 32–37)
MCV RBC AUTO: 87 FL — SIGNIFICANT CHANGE UP (ref 80–94)
NRBC # BLD: 0 /100 WBCS — SIGNIFICANT CHANGE UP (ref 0–0)
PLATELET # BLD AUTO: 160 K/UL — SIGNIFICANT CHANGE UP (ref 130–400)
POTASSIUM SERPL-MCNC: 4.6 MMOL/L — SIGNIFICANT CHANGE UP (ref 3.5–5)
POTASSIUM SERPL-SCNC: 4.6 MMOL/L — SIGNIFICANT CHANGE UP (ref 3.5–5)
PROT SERPL-MCNC: 5.6 G/DL — LOW (ref 6–8)
RBC # BLD: 3.15 M/UL — LOW (ref 4.7–6.1)
RBC # FLD: 12.4 % — SIGNIFICANT CHANGE UP (ref 11.5–14.5)
SODIUM SERPL-SCNC: 130 MMOL/L — LOW (ref 135–146)
WBC # BLD: 7.77 K/UL — SIGNIFICANT CHANGE UP (ref 4.8–10.8)
WBC # FLD AUTO: 7.77 K/UL — SIGNIFICANT CHANGE UP (ref 4.8–10.8)

## 2018-05-13 RX ORDER — FENTANYL CITRATE 50 UG/ML
25 INJECTION INTRAVENOUS ONCE
Qty: 0 | Refills: 0 | Status: DISCONTINUED | OUTPATIENT
Start: 2018-05-13 | End: 2018-05-13

## 2018-05-13 RX ORDER — METOPROLOL TARTRATE 50 MG
25 TABLET ORAL
Qty: 0 | Refills: 0 | Status: DISCONTINUED | OUTPATIENT
Start: 2018-05-13 | End: 2018-05-15

## 2018-05-13 RX ORDER — METOPROLOL TARTRATE 50 MG
25 TABLET ORAL ONCE
Qty: 0 | Refills: 0 | Status: COMPLETED | OUTPATIENT
Start: 2018-05-13 | End: 2018-05-13

## 2018-05-13 RX ORDER — ENOXAPARIN SODIUM 100 MG/ML
40 INJECTION SUBCUTANEOUS ONCE
Qty: 0 | Refills: 0 | Status: COMPLETED | OUTPATIENT
Start: 2018-05-13 | End: 2018-05-13

## 2018-05-13 RX ORDER — SENNA PLUS 8.6 MG/1
1 TABLET ORAL EVERY 12 HOURS
Qty: 0 | Refills: 0 | Status: DISCONTINUED | OUTPATIENT
Start: 2018-05-13 | End: 2018-05-16

## 2018-05-13 RX ORDER — KETOROLAC TROMETHAMINE 30 MG/ML
15 SYRINGE (ML) INJECTION ONCE
Qty: 0 | Refills: 0 | Status: DISCONTINUED | OUTPATIENT
Start: 2018-05-13 | End: 2018-05-13

## 2018-05-13 RX ADMIN — Medication 100 GRAM(S): at 18:18

## 2018-05-13 RX ADMIN — Medication 100 GRAM(S): at 07:12

## 2018-05-13 RX ADMIN — Medication 600 MILLIGRAM(S): at 05:07

## 2018-05-13 RX ADMIN — ENOXAPARIN SODIUM 40 MILLIGRAM(S): 100 INJECTION SUBCUTANEOUS at 10:36

## 2018-05-13 RX ADMIN — SENNA PLUS 1 TABLET(S): 8.6 TABLET ORAL at 15:07

## 2018-05-13 RX ADMIN — Medication 100 MILLIGRAM(S): at 15:06

## 2018-05-13 RX ADMIN — FAMOTIDINE 20 MILLIGRAM(S): 10 INJECTION INTRAVENOUS at 05:08

## 2018-05-13 RX ADMIN — Medication 100 MILLIGRAM(S): at 21:10

## 2018-05-13 RX ADMIN — Medication 325 MILLIGRAM(S): at 13:06

## 2018-05-13 RX ADMIN — Medication 400 MILLIGRAM(S): at 13:06

## 2018-05-13 RX ADMIN — HEPARIN SODIUM 5000 UNIT(S): 5000 INJECTION INTRAVENOUS; SUBCUTANEOUS at 05:08

## 2018-05-13 RX ADMIN — Medication 15 MILLIGRAM(S): at 21:51

## 2018-05-13 RX ADMIN — Medication 2: at 07:23

## 2018-05-13 RX ADMIN — Medication 600 MILLIGRAM(S): at 17:32

## 2018-05-13 RX ADMIN — Medication 25 MILLIGRAM(S): at 18:18

## 2018-05-13 RX ADMIN — ATORVASTATIN CALCIUM 40 MILLIGRAM(S): 80 TABLET, FILM COATED ORAL at 21:10

## 2018-05-13 RX ADMIN — Medication 100 MILLIGRAM(S): at 05:08

## 2018-05-13 RX ADMIN — Medication 15 MILLIGRAM(S): at 21:24

## 2018-05-13 RX ADMIN — Medication 12.5 MILLIGRAM(S): at 05:08

## 2018-05-13 RX ADMIN — Medication 100 GRAM(S): at 05:08

## 2018-05-13 RX ADMIN — FENTANYL CITRATE 25 MICROGRAM(S): 50 INJECTION INTRAVENOUS at 05:44

## 2018-05-13 RX ADMIN — FAMOTIDINE 20 MILLIGRAM(S): 10 INJECTION INTRAVENOUS at 17:32

## 2018-05-13 RX ADMIN — FENTANYL CITRATE 25 MICROGRAM(S): 50 INJECTION INTRAVENOUS at 05:45

## 2018-05-13 NOTE — PROGRESS NOTE ADULT - SUBJECTIVE AND OBJECTIVE BOX
OPERATIVE PROCEDURE(s):    OPCABx2            POD #2	    SURGEON(s):         MOMO	    SUBJECTIVE ASSESSMENT:    Vital Signs Last 24 Hrs  T(C): 37.6 (13 May 2018 05:00), Max: 37.6 (13 May 2018 05:00)  T(F): 99.6 (13 May 2018 05:00), Max: 99.6 (13 May 2018 05:00)  HR: 98 (13 May 2018 05:00) (88 - 100)  BP: 123/86 (13 May 2018 05:00) (101/74 - 123/86)  BP(mean): 98 (13 May 2018 05:00) (75 - 98)  RR: 13 (12 May 2018 10:24) (13 - 13)  SpO2: 98% (13 May 2018 05:00) (88% - 98%)  05-12-18 @ 07:01  -  05-13-18 @ 07:00  --------------------------------------------------------  IN: 2069 mL / OUT: 1358 mL / NET: 711 mL    05-13-18 @ 07:01  -  05-13-18 @ 10:08  --------------------------------------------------------  IN: 350 mL / OUT: 450 mL / NET: -100 mL        Physical Exam  General:  Chest:  CVS:  Abd:   GI/ :  Ext:    Central Venous Catheter: Yes[ ]  No[ ] , If Yes indication:           Day #    Kaba Catheter: Yes  [ ] , No [ ] : If yes indication:                         Day #    NGT: Yes [ ] No [  ]     If Yes Placement:                                          Day #    Post-Op Beta-Blockers: Yes [ ], No[ ], If No, then contraindication:    CHEST TUBE:  [ ] YES [ ] NO  OUTPUT:     per 24 hours    AIR LEAKS:  [ ] YES [ ] NO      EPICARDIAL WIRES:  [ ] YES [ ] NO      BOWEL MOVEMENT:  [ ] YES [ ] NO          LABS:                        9.5    7.77  )-----------( 160      ( 13 May 2018 03:02 )             27.4     COUMADIN:   [ ] YES [ ] NO    PT/INR - ( 12 May 2018 02:24 )   PT: 13.50 sec;   INR: 1.24 ratio         PTT - ( 12 May 2018 02:24 )  PTT:29.1 sec  05-13    130<L>  |  97<L>  |  10  ----------------------------<  124<H>  4.6   |  24  |  0.8    Ca    7.6<L>      13 May 2018 03:02  Phos  2.8     05-11  Mg     2.6     05-13    TPro  5.6<L>  /  Alb  4.3  /  TBili  1.2  /  DBili  0.2  /  AST  17  /  ALT  14  /  AlkPhos  39  05-13        MEDICATIONS  (STANDING):  acyclovir   Tablet 400 milliGRAM(s) Oral daily  aspirin 325 milliGRAM(s) Oral daily  atorvastatin 40 milliGRAM(s) Oral at bedtime  dextrose 5%. 1000 milliLiter(s) (50 mL/Hr) IV Continuous <Continuous>  dextrose 50% Injectable 12.5 Gram(s) IV Push once  dextrose 50% Injectable 25 Gram(s) IV Push once  dextrose 50% Injectable 25 Gram(s) IV Push once  docusate sodium 100 milliGRAM(s) Oral three times a day  famotidine    Tablet 20 milliGRAM(s) Oral two times a day  guaiFENesin  milliGRAM(s) Oral every 12 hours  heparin  Injectable 5000 Unit(s) SubCutaneous every 8 hours  insulin lispro (HumaLOG) corrective regimen sliding scale   SubCutaneous three times a day before meals  magnesium sulfate  IVPB 1 Gram(s) IV Intermittent every 12 hours  metoprolol tartrate 12.5 milliGRAM(s) Oral two times a day    MEDICATIONS  (PRN):  ALBUTerol/ipratropium for Nebulization 3 milliLiter(s) Nebulizer every 6 hours PRN Shortness of Breath and/or Wheezing  dextrose 40% Gel 15 Gram(s) Oral once PRN Blood Glucose LESS THAN 70 milliGRAM(s)/deciliter  glucagon  Injectable 1 milliGRAM(s) IntraMuscular once PRN Glucose LESS THAN 70 milligrams/deciliter  ondansetron  IVPB 4 milliGRAM(s) IV Intermittent once PRN Nausea and/or Vomiting  oxyCODONE    IR 10 milliGRAM(s) Oral every 4 hours PRN Severe Pain (7 - 10)  oxyCODONE    IR 5 milliGRAM(s) Oral every 4 hours PRN Moderate Pain (4 - 6)      Allergies    No Known Allergies    Intolerances        Ambulation/Activity Status:        RADIOLOGY & ADDITIONAL TESTS:        Assessment/Plan:    Social Service Disposition:

## 2018-05-13 NOTE — PHYSICAL THERAPY INITIAL EVALUATION ADULT - GAIT DEVIATIONS NOTED, PT EVAL
decreased sonido/decreased step length/Slow moving, mildly wide UCHE with lateral trunk sway ,minimal arm swing/decreased stride length

## 2018-05-13 NOTE — PROGRESS NOTE ADULT - SUBJECTIVE AND OBJECTIVE BOX
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   Current Complains:  MIGUEL FRAUSTO has no new complains    OBJECTIVE:  Vitals last 24 hrs  T(C): 37.5 (05-13-18 @ 09:29), Max: 37.6 (05-13-18 @ 05:00)  HR: 96 (05-13-18 @ 12:02) (92 - 100)  BP: 124/86 (05-13-18 @ 12:02) (101/74 - 124/86)  ABP: --  ABP(mean): --  RR: 18 (05-13-18 @ 12:02) (18 - 22)  SpO2: 97% (05-13-18 @ 12:02) (88% - 98%)  CVP(mm Hg): --  CI: --  PA(direct): --  PCWP: --  SVR: --    05-12-18 @ 07:01  -  05-13-18 @ 07:00  --------------------------------------------------------  IN:    Albumin 5%  - 500 mL: 1000 mL    insulin Infusion: 6 mL    norepinephrine Infusion: 13 mL    Oral Fluid: 1050 mL  Total IN: 2069 mL    OUT:    Chest Tube: 569 mL    Indwelling Catheter - Urethral: 189 mL    Voided: 600 mL  Total OUT: 1358 mL    Total NET: 711 mL          CAPILLARY BLOOD GLUCOSE  153 (12 May 2018 17:29)        LABS:  ABG - ( 12 May 2018 04:38 )  pH, Arterial: 7.35  pH, Blood: x     /  pCO2: 41    /  pO2: 140   / HCO3: 23    / Base Excess: -2.6  /  SaO2: 99                            9.5    7.77  )-----------( 160      ( 13 May 2018 03:02 )             27.4     Hemoglobin: 9.5 g/dL (05-13 @ 03:02)  Hemoglobin: 10.8 g/dL (05-12 @ 02:24)  Hemoglobin: 10.9 g/dL (05-11 @ 13:34)    05-13    130<L>  |  97<L>  |  10  ----------------------------<  124<H>  4.6   |  24  |  0.8    Ca    7.6<L>      13 May 2018 03:02  Mg     2.6     05-13    TPro  5.6<L>  /  Alb  4.3  /  TBili  1.2  /  DBili  0.2  /  AST  17  /  ALT  14  /  AlkPhos  39  05-13    Creatinine, Serum: 0.8 mg/dL (05-13 @ 03:02)  Creatinine, Serum: 0.7 mg/dL (05-12 @ 02:24)  Creatinine, Serum: 0.7 mg/dL (05-11 @ 13:34)  Creatinine, Serum: 0.8 mg/dL (05-10 @ 07:11)    PT/INR - ( 12 May 2018 02:24 )   PT: 13.50 sec;   INR: 1.24 ratio     PTT - ( 12 May 2018 02:24 )  PTT:29.1 sec      HOSPITAL MEDICATIONS:  MEDICATIONS  (STANDING):  acyclovir   Tablet 400 milliGRAM(s) Oral daily  aspirin 325 milliGRAM(s) Oral daily  atorvastatin 40 milliGRAM(s) Oral at bedtime  dextrose 5%. 1000 milliLiter(s) (50 mL/Hr) IV Continuous <Continuous>  dextrose 50% Injectable 12.5 Gram(s) IV Push once  dextrose 50% Injectable 25 Gram(s) IV Push once  dextrose 50% Injectable 25 Gram(s) IV Push once  docusate sodium 100 milliGRAM(s) Oral three times a day  enoxaparin Injectable 40 milliGRAM(s) SubCutaneous once  famotidine    Tablet 20 milliGRAM(s) Oral two times a day  guaiFENesin  milliGRAM(s) Oral every 12 hours  insulin lispro (HumaLOG) corrective regimen sliding scale   SubCutaneous three times a day before meals  magnesium sulfate  IVPB 1 Gram(s) IV Intermittent every 12 hours  metoprolol tartrate 12.5 milliGRAM(s) Oral two times a day    MEDICATIONS  (PRN):  ALBUTerol/ipratropium for Nebulization 3 milliLiter(s) Nebulizer every 6 hours PRN Shortness of Breath and/or Wheezing  dextrose 40% Gel 15 Gram(s) Oral once PRN Blood Glucose LESS THAN 70 milliGRAM(s)/deciliter  glucagon  Injectable 1 milliGRAM(s) IntraMuscular once PRN Glucose LESS THAN 70 milligrams/deciliter  ondansetron  IVPB 4 milliGRAM(s) IV Intermittent once PRN Nausea and/or Vomiting  oxyCODONE    IR 10 milliGRAM(s) Oral every 4 hours PRN Severe Pain (7 - 10)  oxyCODONE    IR 5 milliGRAM(s) Oral every 4 hours PRN Moderate Pain (4 - 6)  senna 1 Tablet(s) Oral every 12 hours PRN Constipation      REVIEW OF SYSTEMS:  CONSTITUTIONAL: [X] all negative; [ ] weakness, [ ] fevers, [ ] chills  EYES/ENT: [X] all negative; [ ] visual changes, [ ] vertigo, [ ] throat pain   NECK: [X] all negative; [ ] pain, [ ] stiffness  RESPIRATORY: [ ] all negative, [x ] cough, [ ] wheezing, [ ] hemoptysis, [x ] shortness of breath  CARDIOVASCULAR: [ ] all negative; [x ] chest pain, [ ] palpitations, [ ] orthopnea  GASTROINTESTINAL: [X] all negative; [ ]abdominal pain, [ ] nausea, [ ] vomiting, [ ] hematemesis, [ ] diarrhea, [ ] constipation, [ ] melena, [ ] hematochezia.  GENITOURINARY: [X] all negative; [ ] dysuria, [ ] frequency, [ ] hematuria  NEUROLOGICAL: [X] all negative; [ ] numbness, [ ] weakness  SKIN: [X] all negative; [ ] itching, [ ] burning, [ ] rashes, [ ] lesions   All other review of systems is negative unless indicated above.    [  ] Unable to assess ROS because     PHYSICAL EXAM:          CONSTITUTIONAL: Well-developed; well-nourished; in no acute distress.   	SKIN: warm, dry  	HEAD: Normocephalic; atraumatic.  	EYES: PERRL, EOM, no conj injection, sclera clear  	ENT: No nasal discharge; airway clear.  	NECK: Supple; non tender.  No midline ttp ctls  	CARD: S1, S2 normal; no murmurs, no gallops, no rubs. Regular rate and rhythm. 2+ RPs and DPs bilat, no carotid bruits,   	RESP: CTAB good air movement No wheezes, no rales no rhonchi.  	ABD: Soft, not tender, not distended, no CVA ttp no rebound no guarding, bowel sounds present  	EXT: Normal ROM.  No clubbing, no cyanosis, no pedal edema, no calf pain b/l  	LYMPH: No acute cervical adenopathy.  	NEURO: Alert, awake, motor 5/5 R, 5/5 L, sensation intact bilat, CN 2-12 intact, no nystagmus, no dysmetria on finger to nose, neg pronator, neg romberg, no quadrantanopsia, nml gait.          PSYCH: Cooperative, appropriate. No SI, no HI, no VH, no AH.    RADIOLOGY:  X Reviewed and interpreted by me  CxR from 05-13-18 shows mild congestion, no pneumothorax, no effusion, no cardiomegaly,   Chest Tubes in place,     ECG:  X Reviewed and interpreted by me NSR

## 2018-05-13 NOTE — PROVIDER CONTACT NOTE (OTHER) - SITUATION
MD Anderson made aware of VS, pt states that "ever since the chest tubes were split this afternoon I feel my heart racing",

## 2018-05-13 NOTE — PROVIDER CONTACT NOTE (OTHER) - BACKGROUND
pt tachy on monitor, MD made aware lopressor PO dose recently increased, temp 100.4F, pt c/o left sided incisional chest pain, pt states he "feels the greatest relief of pain with toradol"

## 2018-05-14 LAB
ALBUMIN SERPL ELPH-MCNC: 3.9 G/DL — SIGNIFICANT CHANGE UP (ref 3.5–5.2)
ALP SERPL-CCNC: 45 U/L — SIGNIFICANT CHANGE UP (ref 30–115)
ALT FLD-CCNC: 12 U/L — SIGNIFICANT CHANGE UP (ref 0–41)
ANION GAP SERPL CALC-SCNC: 10 MMOL/L — SIGNIFICANT CHANGE UP (ref 7–14)
AST SERPL-CCNC: 13 U/L — SIGNIFICANT CHANGE UP (ref 0–41)
BILIRUB DIRECT SERPL-MCNC: 0.2 MG/DL — SIGNIFICANT CHANGE UP (ref 0–0.2)
BILIRUB INDIRECT FLD-MCNC: 0.5 MG/DL — SIGNIFICANT CHANGE UP (ref 0.2–1.2)
BILIRUB SERPL-MCNC: 0.7 MG/DL — SIGNIFICANT CHANGE UP (ref 0.2–1.2)
BUN SERPL-MCNC: 15 MG/DL — SIGNIFICANT CHANGE UP (ref 10–20)
CALCIUM SERPL-MCNC: 7.7 MG/DL — LOW (ref 8.5–10.1)
CHLORIDE SERPL-SCNC: 103 MMOL/L — SIGNIFICANT CHANGE UP (ref 98–110)
CO2 SERPL-SCNC: 25 MMOL/L — SIGNIFICANT CHANGE UP (ref 17–32)
CREAT SERPL-MCNC: 0.8 MG/DL — SIGNIFICANT CHANGE UP (ref 0.7–1.5)
GLUCOSE SERPL-MCNC: 135 MG/DL — HIGH (ref 70–99)
HCT VFR BLD CALC: 27.9 % — LOW (ref 42–52)
HGB BLD-MCNC: 9.6 G/DL — LOW (ref 14–18)
MAGNESIUM SERPL-MCNC: 2.6 MG/DL — HIGH (ref 1.8–2.4)
MCHC RBC-ENTMCNC: 30 PG — SIGNIFICANT CHANGE UP (ref 27–31)
MCHC RBC-ENTMCNC: 34.4 G/DL — SIGNIFICANT CHANGE UP (ref 32–37)
MCV RBC AUTO: 87.2 FL — SIGNIFICANT CHANGE UP (ref 80–94)
NRBC # BLD: 0 /100 WBCS — SIGNIFICANT CHANGE UP (ref 0–0)
PLATELET # BLD AUTO: 184 K/UL — SIGNIFICANT CHANGE UP (ref 130–400)
POTASSIUM SERPL-MCNC: 4.1 MMOL/L — SIGNIFICANT CHANGE UP (ref 3.5–5)
POTASSIUM SERPL-SCNC: 4.1 MMOL/L — SIGNIFICANT CHANGE UP (ref 3.5–5)
PROT SERPL-MCNC: 5.4 G/DL — LOW (ref 6–8)
RBC # BLD: 3.2 M/UL — LOW (ref 4.7–6.1)
RBC # FLD: 12.2 % — SIGNIFICANT CHANGE UP (ref 11.5–14.5)
SODIUM SERPL-SCNC: 138 MMOL/L — SIGNIFICANT CHANGE UP (ref 135–146)
WBC # BLD: 6.62 K/UL — SIGNIFICANT CHANGE UP (ref 4.8–10.8)
WBC # FLD AUTO: 6.62 K/UL — SIGNIFICANT CHANGE UP (ref 4.8–10.8)

## 2018-05-14 RX ORDER — METOPROLOL TARTRATE 50 MG
25 TABLET ORAL ONCE
Qty: 0 | Refills: 0 | Status: COMPLETED | OUTPATIENT
Start: 2018-05-14 | End: 2018-05-14

## 2018-05-14 RX ORDER — OXYCODONE AND ACETAMINOPHEN 5; 325 MG/1; MG/1
2 TABLET ORAL EVERY 6 HOURS
Qty: 0 | Refills: 0 | Status: DISCONTINUED | OUTPATIENT
Start: 2018-05-14 | End: 2018-05-16

## 2018-05-14 RX ORDER — ENOXAPARIN SODIUM 100 MG/ML
40 INJECTION SUBCUTANEOUS DAILY
Qty: 0 | Refills: 0 | Status: DISCONTINUED | OUTPATIENT
Start: 2018-05-14 | End: 2018-05-16

## 2018-05-14 RX ORDER — KETOROLAC TROMETHAMINE 30 MG/ML
30 SYRINGE (ML) INJECTION EVERY 8 HOURS
Qty: 0 | Refills: 0 | Status: DISCONTINUED | OUTPATIENT
Start: 2018-05-14 | End: 2018-05-16

## 2018-05-14 RX ORDER — OXYCODONE AND ACETAMINOPHEN 5; 325 MG/1; MG/1
1 TABLET ORAL EVERY 6 HOURS
Qty: 0 | Refills: 0 | Status: DISCONTINUED | OUTPATIENT
Start: 2018-05-14 | End: 2018-05-16

## 2018-05-14 RX ADMIN — OXYCODONE AND ACETAMINOPHEN 1 TABLET(S): 5; 325 TABLET ORAL at 22:07

## 2018-05-14 RX ADMIN — ENOXAPARIN SODIUM 40 MILLIGRAM(S): 100 INJECTION SUBCUTANEOUS at 11:26

## 2018-05-14 RX ADMIN — Medication 30 MILLIGRAM(S): at 16:50

## 2018-05-14 RX ADMIN — Medication 600 MILLIGRAM(S): at 06:24

## 2018-05-14 RX ADMIN — Medication 30 MILLIGRAM(S): at 07:45

## 2018-05-14 RX ADMIN — Medication 600 MILLIGRAM(S): at 17:32

## 2018-05-14 RX ADMIN — Medication 25 MILLIGRAM(S): at 17:32

## 2018-05-14 RX ADMIN — SENNA PLUS 1 TABLET(S): 8.6 TABLET ORAL at 11:26

## 2018-05-14 RX ADMIN — OXYCODONE AND ACETAMINOPHEN 1 TABLET(S): 5; 325 TABLET ORAL at 22:40

## 2018-05-14 RX ADMIN — Medication 25 MILLIGRAM(S): at 16:15

## 2018-05-14 RX ADMIN — Medication 325 MILLIGRAM(S): at 11:26

## 2018-05-14 RX ADMIN — FAMOTIDINE 20 MILLIGRAM(S): 10 INJECTION INTRAVENOUS at 17:32

## 2018-05-14 RX ADMIN — Medication 100 MILLIGRAM(S): at 06:24

## 2018-05-14 RX ADMIN — Medication 400 MILLIGRAM(S): at 14:01

## 2018-05-14 RX ADMIN — Medication 100 GRAM(S): at 17:32

## 2018-05-14 RX ADMIN — Medication 100 GRAM(S): at 06:24

## 2018-05-14 RX ADMIN — Medication 30 MILLIGRAM(S): at 17:00

## 2018-05-14 RX ADMIN — Medication 100 MILLIGRAM(S): at 21:53

## 2018-05-14 RX ADMIN — Medication 100 MILLIGRAM(S): at 14:02

## 2018-05-14 RX ADMIN — Medication 5 MILLIGRAM(S): at 12:00

## 2018-05-14 RX ADMIN — Medication 30 MILLIGRAM(S): at 08:00

## 2018-05-14 RX ADMIN — Medication 25 MILLIGRAM(S): at 06:24

## 2018-05-14 RX ADMIN — ATORVASTATIN CALCIUM 40 MILLIGRAM(S): 80 TABLET, FILM COATED ORAL at 21:53

## 2018-05-14 RX ADMIN — FAMOTIDINE 20 MILLIGRAM(S): 10 INJECTION INTRAVENOUS at 06:24

## 2018-05-14 NOTE — PROGRESS NOTE ADULT - SUBJECTIVE AND OBJECTIVE BOX
OPERATIVE PROCEDURE(s):                POD #                       52yMale  SURGEON(s): ANISA Sy  SUBJECTIVE ASSESSMENT:   Vital Signs Last 24 Hrs  T(F): 99.1 (14 May 2018 07:31), Max: 100.4 (13 May 2018 20:37)  HR: 88 (14 May 2018 07:31) (88 - 110)  SR  BP: 130/92 (14 May 2018 07:31) (114/70 - 147/90)  BP(mean): 105 (14 May 2018 07:31) (85 - 108)  ABP: --  ABP(mean): --  RR: 23 (14 May 2018 06:15) (18 - 24)  SpO2: 97% (14 May 2018 07:31) (93% - 97%) RA  CVP(mm Hg): --  CVP(cm H2O): --  CO: --  CI: --  PA: --  SVR: --    I&O's Detail    13 May 2018 07:01  -  14 May 2018 07:00  --------------------------------------------------------  IN:    IV PiggyBack: 100 mL    Oral Fluid: 470 mL  Total IN: 570 mL    OUT:    Chest Tube: 460 mL    Chest Tube: 130 mL    Voided: 1900 mL  Total OUT: 2490 mL        Net:   I&O's Detail    12 May 2018 07:01  -  13 May 2018 07:00  --------------------------------------------------------  Total NET: 711 mL      13 May 2018 07:01  -  14 May 2018 07:00  --------------------------------------------------------  Total NET: -1920 mL        CAPILLARY BLOOD GLUCOSE  122 (14 May 2018 06:15)  130 (13 May 2018 21:10)  131 (13 May 2018 16:31)        Physical Exam:  General: NAD; A&Ox3/Patient is intubated and sedated  Cardiac: S1/S2, RRR, no murmur, no rubs  Lungs: unlabored respirations, CTA b/l, no wheeze, no rales, no crackles  Abdomen: Soft/NT/ND; positive bowel sounds x 4  Sternum: Intact, no click, incision healing well with no drainage  Incisions: Incisions clean/dry/intact  Extremities: No edema b/l lower extremities; good capillary refill; no cyanosis; palpable 1+ pedal pulses b/l    Central Venous Catheter: Yes[x]  No[] , If Yes indication:  Will D/C today         Day #3  Kaba Catheter: Yes  [] , No  [] , If yes indication:                      Day #  NGT: Yes [] No [] ,    If Yes Placement:                                     Day #  EPICARDIAL WIRES:  [] YES [] NO                                              Day #  BOWEL MOVEMENT:  [] YES [] NO, If No, Timing since last BM:      Day #  CHEST TUBE(Left/Right):  [x] YES [] NO, If yes -  AIR LEAKS:  [] YES [x] NO        LABS:                        9.6<L>  6.62  )-----------( 184      ( 14 May 2018 02:32 )             27.9<L>                        9.5<L>  7.77  )-----------( 160      ( 13 May 2018 03:02 )             27.4<L>    05-14    138  |  103  |  15  ----------------------------<  135<H>  4.1   |  25  |  0.8  05-13    130<L>  |  97<L>  |  10  ----------------------------<  124<H>  4.6   |  24  |  0.8    Ca    7.7<L>      14 May 2018 02:32  Mg     2.6     05-14    TPro  5.4<L> [6.0 - 8.0]  /  Alb  3.9 [3.5 - 5.2]  /  TBili  0.7 [0.2 - 1.2]  /  DBili  0.2 [0.0 - 0.2]  /  AST  13 [0 - 41]  /  ALT  12 [0 - 41]  /  AlkPhos  45 [30 - 115]  05-14          RADIOLOGY & ADDITIONAL TESTS:  CXR:   EKG:  MEDICATIONS  (STANDING):  acyclovir   Tablet 400 milliGRAM(s) Oral daily  aspirin 325 milliGRAM(s) Oral daily  atorvastatin 40 milliGRAM(s) Oral at bedtime  dextrose 5%. 1000 milliLiter(s) (50 mL/Hr) IV Continuous <Continuous>  dextrose 50% Injectable 12.5 Gram(s) IV Push once  dextrose 50% Injectable 25 Gram(s) IV Push once  dextrose 50% Injectable 25 Gram(s) IV Push once  docusate sodium 100 milliGRAM(s) Oral three times a day  famotidine    Tablet 20 milliGRAM(s) Oral two times a day  guaiFENesin  milliGRAM(s) Oral every 12 hours  insulin lispro (HumaLOG) corrective regimen sliding scale   SubCutaneous three times a day before meals  magnesium sulfate  IVPB 1 Gram(s) IV Intermittent every 12 hours  metoprolol tartrate 25 milliGRAM(s) Oral two times a day    MEDICATIONS  (PRN):  ALBUTerol/ipratropium for Nebulization 3 milliLiter(s) Nebulizer every 6 hours PRN Shortness of Breath and/or Wheezing  dextrose 40% Gel 15 Gram(s) Oral once PRN Blood Glucose LESS THAN 70 milliGRAM(s)/deciliter  glucagon  Injectable 1 milliGRAM(s) IntraMuscular once PRN Glucose LESS THAN 70 milligrams/deciliter  ondansetron  IVPB 4 milliGRAM(s) IV Intermittent once PRN Nausea and/or Vomiting  oxyCODONE    IR 10 milliGRAM(s) Oral every 4 hours PRN Severe Pain (7 - 10)  oxyCODONE    IR 5 milliGRAM(s) Oral every 4 hours PRN Moderate Pain (4 - 6)  senna 1 Tablet(s) Oral every 12 hours PRN Constipation    HEPARIN:  [] YES [] NO  Dose: XX UNITS/HR UNITS Q8H  LOVENOX:[] YES [] NO  Dose: XX mg Q24H  COUMADIN: []  YES [] NO  Dose: XX mg  Q24H  SCD's: YES b/l  GI Prophylaxis: Protonix [], Pepcid [], None [], (Contra-indication:.....)    Post-Op Beta-Blockers: Yes [], No[], If No, then contraindication:  Post-Op Aspirin: Yes [],  No [], If No, then contraindication:  Post-Op Statin: Yes [], No[], If No, then contraindication:  Allergies    No Known Allergies    Intolerances      Ambulation/Activity Status:    Assessment/Plan:  52y Male status-post .....  - Case and plan discussed with CTU Intensivist and CT Surgeon - Dr. Maradiaga/Kerrie/Rena   - Continue CTU supportive care    - Continue DVT/GI prophylaxis  - Incentive Spirometry 10 times an hour  - Continue to advance physical activity as tolerated and continue PT/OT as directed  1. CAD: Continue ASA, statin, BB  2. HTN:   3. A. Fib:   4. COPD/Hypoxia:   5. DM/Glucose Control:     Social Service Disposition: OPERATIVE PROCEDURE(s):   CABG x 2             POD # 3                      52yMale  SURGEON(s): ANISA Sy  SUBJECTIVE ASSESSMENT: No complaints  Vital Signs Last 24 Hrs  T(F): 99.1 (14 May 2018 07:31), Max: 100.4 (13 May 2018 20:37)  HR: 88 (14 May 2018 07:31) (88 - 110)  SR  BP: 130/92 (14 May 2018 07:31) (114/70 - 147/90)  BP(mean): 105 (14 May 2018 07:31) (85 - 108)  RR: 23 (14 May 2018 06:15) (18 - 24)  SpO2: 97% (14 May 2018 07:31) (93% - 97%) RA      I&O's Detail    13 May 2018 07:01  -  14 May 2018 07:00  --------------------------------------------------------  IN:    IV PiggyBack: 100 mL    Oral Fluid: 470 mL  Total IN: 570 mL    OUT:    Chest Tube: 460 mL    Chest Tube: 130 mL    Voided: 1900 mL  Total OUT: 2490 mL        Net:   I&O's Detail    12 May 2018 07:01  -  13 May 2018 07:00  --------------------------------------------------------  Total NET: 711 mL      13 May 2018 07:01  -  14 May 2018 07:00  --------------------------------------------------------  Total NET: -1920 mL        CAPILLARY BLOOD GLUCOSE  122 (14 May 2018 06:15)  130 (13 May 2018 21:10)  131 (13 May 2018 16:31)        Physical Exam:  General: NAD; A&Ox3  Cardiac: S1/S2, RRR, no murmur, no rubs  Lungs: unlabored respirations, CTA b/l, no wheeze, no rales, no crackles  Abdomen: Soft/NT/ND; positive bowel sounds x 4  Sternum: Intact, no click, incision healing well with no drainage  Incisions: Incisions clean/dry/intact  Extremities: No edema b/l lower extremities; good capillary refill; no cyanosis; palpable 1+ pedal pulses b/l    Central Venous Catheter: Yes[x]  No[] , If Yes indication:  Will D/C today         Day #3  Kaba Catheter: Yes  [] , No  [x] , If yes indication:                      Day #  NGT: Yes [] No [x] ,    If Yes Placement:                                     Day #  EPICARDIAL WIRES:  [] YES [x] NO                                              Day #  BOWEL MOVEMENT:  [] YES [x] NO, If No, Timing since last BM:      Day #  CHEST TUBE(Left/Right):  [x] YES [] NO, If yes -  AIR LEAKS:  [] YES [x] NO    Remove tubes today      LABS:                        9.6<L>  6.62  )-----------( 184      ( 14 May 2018 02:32 )             27.9<L>                        9.5<L>  7.77  )-----------( 160      ( 13 May 2018 03:02 )             27.4<L>    05-14    138  |  103  |  15  ----------------------------<  135<H>  4.1   |  25  |  0.8  05-13    130<L>  |  97<L>  |  10  ----------------------------<  124<H>  4.6   |  24  |  0.8    Ca    7.7<L>      14 May 2018 02:32  Mg     2.6     05-14    TPro  5.4<L> [6.0 - 8.0]  /  Alb  3.9 [3.5 - 5.2]  /  TBili  0.7 [0.2 - 1.2]  /  DBili  0.2 [0.0 - 0.2]  /  AST  13 [0 - 41]  /  ALT  12 [0 - 41]  /  AlkPhos  45 [30 - 115]  05-14          RADIOLOGY & ADDITIONAL TESTS:  CXR:  < from: Xray Chest 1 View-PORTABLE IMMEDIATE (05.14.18 @ 14:52) >  Status post removal of bilateral chest tubes, without definite   radiographic evidence of pneumothorax.    < end of copied text >    EKG: < from: 12 Lead ECG (05.11.18 @ 13:53) >    Systolic  mmHg    Diastolic BP 57 mmHg    Ventricular Rate 160 BPM    Atrial Rate 99 BPM    QRS Duration 142 ms    Q-T Interval 242 ms    QTC Calculation(Bezet) 395 ms    R Axis -14 degrees    T Axis 263 degrees    Diagnosis Line Technically poor tracing  Baseline artifact  Regular sinus rhythm  Early transition  Low voltage QRS limb leads  Nonspecific T wave abnormality minor  Confirmed by SEBAS WILLS MD ([id number]) on 5/11/2018 5:02:25 PM    < end of copied text >    MEDICATIONS  (STANDING):  acyclovir   Tablet 400 milliGRAM(s) Oral daily  aspirin 325 milliGRAM(s) Oral daily  atorvastatin 40 milliGRAM(s) Oral at bedtime  dextrose 5%. 1000 milliLiter(s) (50 mL/Hr) IV Continuous <Continuous>  dextrose 50% Injectable 12.5 Gram(s) IV Push once  dextrose 50% Injectable 25 Gram(s) IV Push once  dextrose 50% Injectable 25 Gram(s) IV Push once  docusate sodium 100 milliGRAM(s) Oral three times a day  famotidine    Tablet 20 milliGRAM(s) Oral two times a day  guaiFENesin  milliGRAM(s) Oral every 12 hours  insulin lispro (HumaLOG) corrective regimen sliding scale   SubCutaneous three times a day before meals  magnesium sulfate  IVPB 1 Gram(s) IV Intermittent every 12 hours  metoprolol tartrate 25 milliGRAM(s) Oral two times a day    MEDICATIONS  (PRN):  ALBUTerol/ipratropium for Nebulization 3 milliLiter(s) Nebulizer every 6 hours PRN Shortness of Breath and/or Wheezing  dextrose 40% Gel 15 Gram(s) Oral once PRN Blood Glucose LESS THAN 70 milliGRAM(s)/deciliter  glucagon  Injectable 1 milliGRAM(s) IntraMuscular once PRN Glucose LESS THAN 70 milligrams/deciliter  ondansetron  IVPB 4 milliGRAM(s) IV Intermittent once PRN Nausea and/or Vomiting  oxyCODONE    IR 10 milliGRAM(s) Oral every 4 hours PRN Severe Pain (7 - 10)  oxyCODONE    IR 5 milliGRAM(s) Oral every 4 hours PRN Moderate Pain (4 - 6)  senna 1 Tablet(s) Oral every 12 hours PRN Constipation    HEPARIN:  [] YES [x] NO  Dose: UNITS/HR UNITS Q8H  LOVENOX:[x] YES [] NO  Dose: 40 mg Q24H  COUMADIN: []  YES [x] NO  Dose: XX mg  Q24H  SCD's: YES b/l  GI Prophylaxis: Protonix [], Pepcid [x], None [], (Contra-indication:.....)    Post-Op Beta-Blockers: Yes [x], No[], If No, then contraindication:  Post-Op Aspirin: Yes [x],  No [], If No, then contraindication:  Post-Op Statin: Yes [x], No[], If No, then contraindication:  Allergies    No Known Allergies    Intolerances      Ambulation/Activity Status: ambulates well several times daily    Assessment/Plan:  52y Male status-post .....  - Case and plan discussed with CTU Intensivist and CT Surgeon - Dr. Maradiaga/Kerrie/Rena   - Continue CTU supportive care    - Continue DVT/GI prophylaxis  - Incentive Spirometry 10 times an hour  - Continue to advance physical activity as tolerated and continue PT/OT as directed  1. CAD: Continue ASA, statin, BB  2. D/C chest tubes today  3. D/C home tomorrow    Social Service Disposition:   Home

## 2018-05-14 NOTE — PROGRESS NOTE ADULT - SUBJECTIVE AND OBJECTIVE BOX
MIGUEL FRAUSTO  MRN#: [id number]  Subjective:  Patient was seen and evalauted on AM rounds offerring no specific compliants at this time.    OBJECTIVE:  ICU Vital Signs Last 24 Hrs  T(C): 37.3 (14 May 2018 07:31), Max: 38 (13 May 2018 20:37)  T(F): 99.1 (14 May 2018 07:31), Max: 100.4 (13 May 2018 20:37)  HR: 88 (14 May 2018 07:31) (88 - 110)  BP: 130/92 (14 May 2018 07:31) (114/70 - 147/90)  BP(mean): 105 (14 May 2018 07:31) (85 - 108)  ABP: --  ABP(mean): --  RR: 23 (14 May 2018 06:15) (18 - 24)  SpO2: 97% (14 May 2018 07:31) (93% - 97%)      05-13 @ 07:01  -  14 @ 07:00  --------------------------------------------------------  IN: 570 mL / OUT: 2490 mL / NET: -1920 mL     @ 07:  -   @ 11:43  --------------------------------------------------------  IN: 240 mL / OUT: 661 mL / NET: -421 mL      CAPILLARY BLOOD GLUCOSE  122 (14 May 2018 06:15)          PHYSICAL EXAM:Daily     Daily Weight in k.4 (14 May 2018 00:00)  General: WN/WD NAD    HEENT:     + NCAT  + EOMI  - Conjuctival edema   - Icterus   - Thrush   - ETT  - NGT/OGT    Neck:         + FROM    - JVD     - Nodes     - Masses    + Mid-line trachea   - Tracheostomy    Chest:         - Sternal click  - Sternal drainage  + Pacing wires  + Chest tubes  - SubQ emphysema    Lungs:          + CTA   - Rhonchi    - Rales    - Wheezing     - Decreased BS   - Dullness R L    Cardiac:       + S1 + S2    + RRR   - Irregular   - S3  - S4    - Murmurs   - Rub   - Hamman’s sign     Abdomen:    + BS     + Soft    + Non-tender     - Distended    - Organomegaly  - PEG    Extremities:   - Cyanosis U/L   - Clubbing  U/L  - LE/UE Edema   + Capillary refill    + Pulses     Neuro:        + Awake   +  Alert   - Confused   - Lethargic   - Sedated   - Generalized Weakness    Skin:        - Rashes    - Erythema   + Normal incisions   + IV sites intact  - Sacral decubitus    HOSPITAL MEDICATIONS:  MEDICATIONS  (STANDING):  acyclovir   Tablet 400 milliGRAM(s) Oral daily  aspirin 325 milliGRAM(s) Oral daily  atorvastatin 40 milliGRAM(s) Oral at bedtime  dextrose 5%. 1000 milliLiter(s) (50 mL/Hr) IV Continuous <Continuous>  dextrose 50% Injectable 12.5 Gram(s) IV Push once  dextrose 50% Injectable 25 Gram(s) IV Push once  dextrose 50% Injectable 25 Gram(s) IV Push once  docusate sodium 100 milliGRAM(s) Oral three times a day  enoxaparin Injectable 40 milliGRAM(s) SubCutaneous daily  famotidine    Tablet 20 milliGRAM(s) Oral two times a day  guaiFENesin  milliGRAM(s) Oral every 12 hours  insulin lispro (HumaLOG) corrective regimen sliding scale   SubCutaneous three times a day before meals  magnesium sulfate  IVPB 1 Gram(s) IV Intermittent every 12 hours  metoprolol tartrate 25 milliGRAM(s) Oral two times a day    MEDICATIONS  (PRN):  ALBUTerol/ipratropium for Nebulization 3 milliLiter(s) Nebulizer every 6 hours PRN Shortness of Breath and/or Wheezing  bisacodyl 5 milliGRAM(s) Oral every 12 hours PRN Constipation  dextrose 40% Gel 15 Gram(s) Oral once PRN Blood Glucose LESS THAN 70 milliGRAM(s)/deciliter  glucagon  Injectable 1 milliGRAM(s) IntraMuscular once PRN Glucose LESS THAN 70 milligrams/deciliter  ketorolac   Injectable 30 milliGRAM(s) IV Push every 8 hours PRN Moderate Pain (4 - 6)  ondansetron  IVPB 4 milliGRAM(s) IV Intermittent once PRN Nausea and/or Vomiting  oxyCODONE    5 mG/acetaminophen 325 mG 1 Tablet(s) Oral every 6 hours PRN Moderate Pain (4 - 6)  oxyCODONE    5 mG/acetaminophen 325 mG 2 Tablet(s) Oral every 6 hours PRN Severe Pain (7 - 10)  senna 1 Tablet(s) Oral every 12 hours PRN Constipation      LABS:                        9.6    6.62  )-----------( 184      ( 14 May 2018 02:32 )             27.9    05-14    138  |  103  |  15  ----------------------------<  135<H>  4.1   |  25  |  0.8    Ca    7.7<L>      14 May 2018 02:32  Mg     2.6     05-14    TPro  5.4<L>  /  Alb  3.9  /  TBili  0.7  /  DBili  0.2  /  AST  13  /  ALT  12  /  AlkPhos  45  05-14     LIVER FUNCTIONS - ( 14 May 2018 02:32 )  Alb: 3.9 g/dL / Pro: 5.4 g/dL / ALK PHOS: 45 U/L / ALT: 12 U/L / AST: 13 U/L / GGT: x               RADIOLOGY:  X Reviewed and interpreted by me: bibasilar opacities    CARDIOPULMONARY DYSFUNCTION  - Respiratory status required supplemental oxygen & the following of continuous pulse oximetry for support & to prevent decompensation  - Continued early mobilization as tolerated  - Addressed analgesic regimen to optimize function    PREVENTION-PROPHYLAXIS  - ASA continued for graft occlusion-thromboembolism prophylaxis  - Lipitor/Zocor was also started for long term graft patency  - Lovenox/Heparin initiated/continued for VTE prophylaxis in addition to Venodyne boots  - Protonix/Pepcid maintained for GI bleeding prophylaxis  - Lopressor initiated/continued for atrial fibrillation prophylaxis  - Metabolic stability & infection prophylaxis required review and adjustment of regular Insulin sliding scale and gylcemic regimen while following serial glucose levels to help achieve & maintain euglycemia  - Reviewed & addressed surgical site infection prophylaxis regimen

## 2018-05-15 LAB
ANION GAP SERPL CALC-SCNC: 11 MMOL/L — SIGNIFICANT CHANGE UP (ref 7–14)
BUN SERPL-MCNC: 13 MG/DL — SIGNIFICANT CHANGE UP (ref 10–20)
CALCIUM SERPL-MCNC: 8.1 MG/DL — LOW (ref 8.5–10.1)
CHLORIDE SERPL-SCNC: 105 MMOL/L — SIGNIFICANT CHANGE UP (ref 98–110)
CO2 SERPL-SCNC: 25 MMOL/L — SIGNIFICANT CHANGE UP (ref 17–32)
CREAT SERPL-MCNC: 0.8 MG/DL — SIGNIFICANT CHANGE UP (ref 0.7–1.5)
GLUCOSE SERPL-MCNC: 109 MG/DL — HIGH (ref 70–99)
HCT VFR BLD CALC: 27.5 % — LOW (ref 42–52)
HGB BLD-MCNC: 9.7 G/DL — LOW (ref 14–18)
MAGNESIUM SERPL-MCNC: 2.1 MG/DL — SIGNIFICANT CHANGE UP (ref 1.8–2.4)
MCHC RBC-ENTMCNC: 30.3 PG — SIGNIFICANT CHANGE UP (ref 27–31)
MCHC RBC-ENTMCNC: 35.3 G/DL — SIGNIFICANT CHANGE UP (ref 32–37)
MCV RBC AUTO: 85.9 FL — SIGNIFICANT CHANGE UP (ref 80–94)
NRBC # BLD: 0 /100 WBCS — SIGNIFICANT CHANGE UP (ref 0–0)
PLATELET # BLD AUTO: 220 K/UL — SIGNIFICANT CHANGE UP (ref 130–400)
POTASSIUM SERPL-MCNC: 3.9 MMOL/L — SIGNIFICANT CHANGE UP (ref 3.5–5)
POTASSIUM SERPL-SCNC: 3.9 MMOL/L — SIGNIFICANT CHANGE UP (ref 3.5–5)
RBC # BLD: 3.2 M/UL — LOW (ref 4.7–6.1)
RBC # FLD: 12.3 % — SIGNIFICANT CHANGE UP (ref 11.5–14.5)
SODIUM SERPL-SCNC: 141 MMOL/L — SIGNIFICANT CHANGE UP (ref 135–146)
WBC # BLD: 6.64 K/UL — SIGNIFICANT CHANGE UP (ref 4.8–10.8)
WBC # FLD AUTO: 6.64 K/UL — SIGNIFICANT CHANGE UP (ref 4.8–10.8)

## 2018-05-15 RX ORDER — ACYCLOVIR SODIUM 500 MG
1 VIAL (EA) INTRAVENOUS
Qty: 5 | Refills: 0 | OUTPATIENT
Start: 2018-05-15 | End: 2018-05-19

## 2018-05-15 RX ORDER — FAMOTIDINE 10 MG/ML
1 INJECTION INTRAVENOUS
Qty: 60 | Refills: 0 | OUTPATIENT
Start: 2018-05-15

## 2018-05-15 RX ORDER — METOPROLOL TARTRATE 50 MG
5 TABLET ORAL ONCE
Qty: 0 | Refills: 0 | Status: DISCONTINUED | OUTPATIENT
Start: 2018-05-15 | End: 2018-05-15

## 2018-05-15 RX ORDER — METOPROLOL TARTRATE 50 MG
5 TABLET ORAL ONCE
Qty: 0 | Refills: 0 | Status: COMPLETED | OUTPATIENT
Start: 2018-05-15 | End: 2018-05-15

## 2018-05-15 RX ORDER — ATORVASTATIN CALCIUM 80 MG/1
1 TABLET, FILM COATED ORAL
Qty: 0 | Refills: 0 | COMMUNITY
Start: 2018-05-15

## 2018-05-15 RX ORDER — METOPROLOL TARTRATE 50 MG
1 TABLET ORAL
Qty: 60 | Refills: 0 | OUTPATIENT
Start: 2018-05-15

## 2018-05-15 RX ORDER — DOCUSATE SODIUM 100 MG
1 CAPSULE ORAL
Qty: 90 | Refills: 0 | OUTPATIENT
Start: 2018-05-15

## 2018-05-15 RX ORDER — ASPIRIN/CALCIUM CARB/MAGNESIUM 324 MG
1 TABLET ORAL
Qty: 30 | Refills: 0 | OUTPATIENT
Start: 2018-05-15

## 2018-05-15 RX ORDER — METOPROLOL TARTRATE 50 MG
50 TABLET ORAL
Qty: 0 | Refills: 0 | Status: DISCONTINUED | OUTPATIENT
Start: 2018-05-15 | End: 2018-05-16

## 2018-05-15 RX ORDER — DIAZEPAM 5 MG
0 TABLET ORAL
Qty: 0 | Refills: 0 | COMMUNITY

## 2018-05-15 RX ADMIN — ATORVASTATIN CALCIUM 40 MILLIGRAM(S): 80 TABLET, FILM COATED ORAL at 21:04

## 2018-05-15 RX ADMIN — FAMOTIDINE 20 MILLIGRAM(S): 10 INJECTION INTRAVENOUS at 17:30

## 2018-05-15 RX ADMIN — Medication 30 MILLIGRAM(S): at 23:41

## 2018-05-15 RX ADMIN — Medication 100 GRAM(S): at 05:37

## 2018-05-15 RX ADMIN — Medication 600 MILLIGRAM(S): at 17:30

## 2018-05-15 RX ADMIN — Medication 100 MILLIGRAM(S): at 21:04

## 2018-05-15 RX ADMIN — Medication 30 MILLIGRAM(S): at 14:54

## 2018-05-15 RX ADMIN — Medication 400 MILLIGRAM(S): at 11:53

## 2018-05-15 RX ADMIN — Medication 50 MILLIGRAM(S): at 17:30

## 2018-05-15 RX ADMIN — ENOXAPARIN SODIUM 40 MILLIGRAM(S): 100 INJECTION SUBCUTANEOUS at 11:53

## 2018-05-15 RX ADMIN — Medication 600 MILLIGRAM(S): at 05:37

## 2018-05-15 RX ADMIN — Medication 30 MILLIGRAM(S): at 01:55

## 2018-05-15 RX ADMIN — Medication 100 GRAM(S): at 17:30

## 2018-05-15 RX ADMIN — Medication 100 MILLIGRAM(S): at 13:49

## 2018-05-15 RX ADMIN — Medication 25 MILLIGRAM(S): at 05:36

## 2018-05-15 RX ADMIN — Medication 325 MILLIGRAM(S): at 11:53

## 2018-05-15 RX ADMIN — Medication 5 MILLIGRAM(S): at 14:56

## 2018-05-15 RX ADMIN — FAMOTIDINE 20 MILLIGRAM(S): 10 INJECTION INTRAVENOUS at 05:37

## 2018-05-15 RX ADMIN — Medication 30 MILLIGRAM(S): at 15:30

## 2018-05-15 RX ADMIN — Medication 30 MILLIGRAM(S): at 01:26

## 2018-05-15 RX ADMIN — Medication 100 MILLIGRAM(S): at 05:37

## 2018-05-15 NOTE — PROGRESS NOTE ADULT - SUBJECTIVE AND OBJECTIVE BOX
Over Night Events:    no cpmplains    ROS:  See HPI    PHYSICAL EXAM    ICU Vital Signs Last 24 Hrs  T(C): 36.2 (15 May 2018 07:40), Max: 37.1 (14 May 2018 13:00)  T(F): 97.1 (15 May 2018 07:40), Max: 98.8 (14 May 2018 13:00)  HR: 98 (15 May 2018 09:20) (88 - 114)  BP: 132/85 (15 May 2018 09:20) (115/84 - 155/97)  BP(mean): 100 (15 May 2018 09:20) (84 - 115)  ABP: --  ABP(mean): --  RR: 18 (15 May 2018 09:20) (9 - 18)  SpO2: 96% (15 May 2018 09:20) (95% - 99%)      General:AO x3  HEENT:           Nl     Lymph Nodes: NO cervical LN   Lungs: Bilateral BS  Cardiovascular: Regular   Abdomen: Soft, Positive BS  Extremities: No clubbing   Skin: Nl  Neurological: Nl      LABS:                          9.7    6.64  )-----------( 220      ( 15 May 2018 04:06 )             27.5                                               05-15    141  |  105  |  13  ----------------------------<  109<H>  3.9   |  25  |  0.8    Ca    8.1<L>      15 May 2018 04:06  Mg     2.1     05-15    TPro  5.4<L>  /  Alb  3.9  /  TBili  0.7  /  DBili  0.2  /  AST  13  /  ALT  12  /  AlkPhos  45  05-14                                                                                           LIVER FUNCTIONS - ( 14 May 2018 02:32 )  Alb: 3.9 g/dL / Pro: 5.4 g/dL / ALK PHOS: 45 U/L / ALT: 12 U/L / AST: 13 U/L / GGT: x                                                                                                                                       MEDICATIONS  (STANDING):  acyclovir   Tablet 400 milliGRAM(s) Oral daily  aspirin 325 milliGRAM(s) Oral daily  atorvastatin 40 milliGRAM(s) Oral at bedtime  dextrose 50% Injectable 12.5 Gram(s) IV Push once  dextrose 50% Injectable 25 Gram(s) IV Push once  docusate sodium 100 milliGRAM(s) Oral three times a day  enoxaparin Injectable 40 milliGRAM(s) SubCutaneous daily  famotidine    Tablet 20 milliGRAM(s) Oral two times a day  guaiFENesin  milliGRAM(s) Oral every 12 hours  magnesium sulfate  IVPB 1 Gram(s) IV Intermittent every 12 hours  metoprolol tartrate 25 milliGRAM(s) Oral two times a day    MEDICATIONS  (PRN):  ALBUTerol/ipratropium for Nebulization 3 milliLiter(s) Nebulizer every 6 hours PRN Shortness of Breath and/or Wheezing  bisacodyl 5 milliGRAM(s) Oral every 12 hours PRN Constipation  ketorolac   Injectable 30 milliGRAM(s) IV Push every 8 hours PRN Moderate Pain (4 - 6)  ondansetron  IVPB 4 milliGRAM(s) IV Intermittent once PRN Nausea and/or Vomiting  oxyCODONE    5 mG/acetaminophen 325 mG 1 Tablet(s) Oral every 6 hours PRN Moderate Pain (4 - 6)  oxyCODONE    5 mG/acetaminophen 325 mG 2 Tablet(s) Oral every 6 hours PRN Severe Pain (7 - 10)  senna 1 Tablet(s) Oral every 12 hours PRN Constipation      Xrays:                                                                                     ECHO    < from: Xray Chest 1 View- PORTABLE-Routine (05.15.18 @ 04:40) >  IMPRESSION:      Improving left basilar opacity.     < end of copied text >  IMPRESSION:      · Assessment		    s/p CABG x 2-POD #4  1-BP control-continue beta-blockers  2-serum glucose control-insulin sliding scale regimen  3-acute blood loss anemia-stable, continue to monitor Hb/Hct daily

## 2018-05-15 NOTE — PROGRESS NOTE ADULT - ASSESSMENT
Assessment/Plan:  s/p CABG x 2-POD #3  1-BP control-continue beta-blockers  2-serum glucose control-insulin sliding scale regimen  3-acute blood loss anemia-stable, continue to monitor Hb/Hct daily
Assessment/Plan:  s/p CABG x 2-POD #1  1-BP control-beta-blockers  2-serum glucose control-insuli sliding scale regimen  3-acute blood loss anemia-stable, continue to monitor daily
CTS ATTENDING    ANTICIPATE DISCHARGE TOMORROW
CTS ATTENDING    LOW GRADE FEVER  POOR INSPIRATION ON CXR  PLEURAL TUBES STILL DRAINING  AMBULATES WELL  NEEDS BETTER PAIN CONTROL  OVERALL MAKING GOOD PROGRESS ON DAY#2 POST CABG
For CABg in AM
PLAN  Neuro: move all 4 extremities.  Pain management done.   aspirin 325 milliGRAM(s) Oral daily    Pulm: Encourage coughing, deep breathing and use of incentive spirometry. Wean off supplemental oxygen as able. Daily CXR.   ALBUTerol/ipratropium for Nebulization 3 milliLiter(s) Nebulizer every 6 hours PRN  guaiFENesin  milliGRAM(s) Oral every 12 hours    Cardio: Monitor telemetry/alarms. Continue supportive care   metoprolol tartrate 12.5 milliGRAM(s) Oral two times a day    GI: Tolerating diet. Continue stool softeners.    famotidine    Tablet 20 milliGRAM(s) Oral two times a day  senna 1 Tablet(s) Oral every 12 hours PRN    Nutrition: Continue cardiac, carb consistent diet as tolerated  Endocrine and glucose control:   atorvastatin 40 milliGRAM(s) Oral at bedtime  dextrose 40% Gel 15 Gram(s) Oral once PRN  dextrose 50% Injectable 12.5 Gram(s) IV Push once  dextrose 50% Injectable 25 Gram(s) IV Push once  dextrose 50% Injectable 25 Gram(s) IV Push once  glucagon  Injectable 1 milliGRAM(s) IntraMuscular once PRN  insulin lispro (HumaLOG) corrective regimen sliding scale   SubCutaneous three times a day before meals    Renal: monitor urine output, supplement electrolytes as needed,     Vasc: Heparin SC and/or SCDs for DVT prophylaxis  Heme: 9.5 g/dL  10.8 g/dL    enoxaparin Injectable 40 milliGRAM(s) SubCutaneous once    ID: Off antibiotics. Stable, no fever , no chills.   acyclovir   Tablet 400 milliGRAM(s) Oral daily    Tubes: Monitor Chest tube output  Therapy: OOB/ambulate  Disposition: start planing discharge home or placement    Pertinent clinical, laboratory, radiographic, hemodynamic, echocardiographic, respiratory data, microbiologic data and chart were reviewed and analyzed frequently throughout the course of the day and night. GI and DVT prophylaxis, glycemic control, head of bed elevation and skin care issues were addressed.  Patient seen, examined and plan discussed with CT Surgery / CTICU team during rounds.
PLAN  Neuro: move all 4 extremities.  Pain management done.   aspirin 325 milliGRAM(s) Oral daily    Pulm: Encourage coughing, deep breathing and use of incentive spirometry. Wean off supplemental oxygen as able. Daily CXR.   ALBUTerol/ipratropium for Nebulization 3 milliLiter(s) Nebulizer every 6 hours PRN  guaiFENesin  milliGRAM(s) Oral every 12 hours    Cardio: Monitor telemetry/alarms. Continue supportive care   metoprolol tartrate 25 milliGRAM(s) Oral two times a day    GI: Tolerating diet. Continue stool softeners.    famotidine    Tablet 20 milliGRAM(s) Oral two times a day  senna 1 Tablet(s) Oral every 12 hours PRN    Nutrition: Continue cardiac, carb consistent diet as tolerated  Endocrine and glucose control:   atorvastatin 40 milliGRAM(s) Oral at bedtime  dextrose 40% Gel 15 Gram(s) Oral once PRN  dextrose 50% Injectable 12.5 Gram(s) IV Push once  dextrose 50% Injectable 25 Gram(s) IV Push once  dextrose 50% Injectable 25 Gram(s) IV Push once  glucagon  Injectable 1 milliGRAM(s) IntraMuscular once PRN  insulin lispro (HumaLOG) corrective regimen sliding scale   SubCutaneous three times a day before meals    Renal: monitor urine output, supplement electrolytes as needed,     Vasc: Heparin SC and/or SCDs for DVT prophylaxis  Heme: 9.5 g/dL  10.8 g/dL    enoxaparin Injectable 40 milliGRAM(s) SubCutaneous once    ID: Off antibiotics. Stable, no fever , no chills.   acyclovir   Tablet 400 milliGRAM(s) Oral daily      Therapy: OOB/ambulate  Disposition:  home today    Pertinent clinical, laboratory, radiographic, hemodynamic, echocardiographic, respiratory data, microbiologic data and chart were reviewed and analyzed frequently throughout the course of the day and night. GI and DVT prophylaxis, glycemic control, head of bed elevation and skin care issues were addressed.  Patient seen, examined and plan discussed with CT Surgery / CTICU team during rounds.

## 2018-05-15 NOTE — PROGRESS NOTE ADULT - PROVIDER SPECIALTY LIST ADULT
CT Surgery
Critical Care

## 2018-05-15 NOTE — PROGRESS NOTE ADULT - SUBJECTIVE AND OBJECTIVE BOX
OPERATIVE PROCEDURE(s):   CABGx2              POD #       4                52yMale  SURGEON(s): ANISA Sy  SUBJECTIVE ASSESSMENT: pt seen and examined. No acute complaints at this time.   Vital Signs Last 24 Hrs  T(F): 97.6 (15 May 2018 12:00), Max: 97.6 (15 May 2018 12:00)  HR: 98 (15 May 2018 12:00) (88 - 114)  BP: 158/94 (15 May 2018 12:00) (115/84 - 158/94)  BP(mean): 116 (15 May 2018 12:00) (84 - 116)  RR: 18 (15 May 2018 09:20) (9 - 18)  SpO2: 99% (15 May 2018 12:00) (95% - 99%)    I&O's Detail    14 May 2018 07:01  -  15 May 2018 07:00  --------------------------------------------------------  IN:    IV PiggyBack: 100 mL    Oral Fluid: 840 mL  Total IN: 940 mL    OUT:    Chest Tube: 62 mL    Chest Tube: 60 mL    Voided: 1525 mL  Total OUT: 1647 mL        Net:   I&O's Detail    13 May 2018 07:01  -  14 May 2018 07:00  --------------------------------------------------------  Total NET: -1920 mL      14 May 2018 07:01  -  15 May 2018 07:00  --------------------------------------------------------  Total NET: -707 mL        CAPILLARY BLOOD GLUCOSE  109 (15 May 2018 05:45)  149 (14 May 2018 11:00)        Physical Exam:  General: NAD; A&Ox3  Cardiac: S1/S2, RRR, no murmur, no rubs  Lungs: unlabored respirations, CTA b/l, no wheeze, no rales, no crackles  Abdomen: Soft/NT/ND; positive bowel sounds x 4  Sternum: Intact, no click, incision healing well with no drainage  Incisions: Incisions clean/dry/intact  Extremities: No edema b/l lower extremities; good capillary refill; no cyanosis; palpable 1+ pedal pulses b/l    Central Venous Catheter: Yes[]  No[] , If Yes indication:           Day #  Kaba Catheter: Yes  [] , No  [] , If yes indication:                      Day #  NGT: Yes [] No [] ,    If Yes Placement:                                     Day #  EPICARDIAL WIRES:  [] YES [] NO                                              Day #  BOWEL MOVEMENT:  [] YES [] NO, If No, Timing since last BM:      Day #  CHEST TUBE(Left/Right):  [] YES [] NO, If yes -  AIR LEAKS:  [] YES [] NO        LABS:                        9.7<L>  6.64  )-----------( 220      ( 15 May 2018 04:06 )             27.5<L>                        9.6<L>  6.62  )-----------( 184      ( 14 May 2018 02:32 )             27.9<L>    05-15    141  |  105  |  13  ----------------------------<  109<H>  3.9   |  25  |  0.8  05-14    138  |  103  |  15  ----------------------------<  135<H>  4.1   |  25  |  0.8    Ca    8.1<L>      15 May 2018 04:06  Mg     2.1     05-15    TPro  5.4<L> [6.0 - 8.0]  /  Alb  3.9 [3.5 - 5.2]  /  TBili  0.7 [0.2 - 1.2]  /  DBili  0.2 [0.0 - 0.2]  /  AST  13 [0 - 41]  /  ALT  12 [0 - 41]  /  AlkPhos  45 [30 - 115]  05-14          RADIOLOGY & ADDITIONAL TESTS:  CXR:  EKG:  MEDICATIONS  (STANDING):  acyclovir   Tablet 400 milliGRAM(s) Oral daily  aspirin 325 milliGRAM(s) Oral daily  atorvastatin 40 milliGRAM(s) Oral at bedtime  dextrose 50% Injectable 12.5 Gram(s) IV Push once  dextrose 50% Injectable 25 Gram(s) IV Push once  docusate sodium 100 milliGRAM(s) Oral three times a day  enoxaparin Injectable 40 milliGRAM(s) SubCutaneous daily  famotidine    Tablet 20 milliGRAM(s) Oral two times a day  guaiFENesin  milliGRAM(s) Oral every 12 hours  magnesium sulfate  IVPB 1 Gram(s) IV Intermittent every 12 hours  metoprolol tartrate 25 milliGRAM(s) Oral two times a day    MEDICATIONS  (PRN):  ALBUTerol/ipratropium for Nebulization 3 milliLiter(s) Nebulizer every 6 hours PRN Shortness of Breath and/or Wheezing  bisacodyl 5 milliGRAM(s) Oral every 12 hours PRN Constipation  ketorolac   Injectable 30 milliGRAM(s) IV Push every 8 hours PRN Moderate Pain (4 - 6)  ondansetron  IVPB 4 milliGRAM(s) IV Intermittent once PRN Nausea and/or Vomiting  oxyCODONE    5 mG/acetaminophen 325 mG 1 Tablet(s) Oral every 6 hours PRN Moderate Pain (4 - 6)  oxyCODONE    5 mG/acetaminophen 325 mG 2 Tablet(s) Oral every 6 hours PRN Severe Pain (7 - 10)  senna 1 Tablet(s) Oral every 12 hours PRN Constipation    HEPARIN:  [] YES [x] NO  Dose: XX UNITS/HR UNITS Q8H  LOVENOX:[x] YES [] NO  Dose: XX mg Q24H  COUMADIN: []  YES [x] NO  Dose: XX mg  Q24H  SCD's: YES b/l  GI Prophylaxis: Protonix [], Pepcid [x], None [], (Contra-indication:.....)    Post-Op Beta-Blockers: Yes [x], No[], If No, then contraindication:  Post-Op Aspirin: Yes [x],  No [], If No, then contraindication:  Post-Op Statin: Yes [x], No[], If No, then contraindication:  Allergies    No Known Allergies    Intolerances      Ambulation/Activity Status: ambulate     Assessment/Plan:  52y Male status-post CABGx2 POD#4  - Case and plan discussed with CTU Intensivist and CT Surgeon - Dr. Maradiaga/Kerrie/Rena   - Continue CTU supportive care    - Continue DVT/GI prophylaxis  - Incentive Spirometry 10 times an hour  - Continue to advance physical activity as tolerated and continue PT/OT as directed  1. CAD: Continue ASA, statin, BB  2. HTN:   3. A. Fib:   4. COPD/Hypoxia:   5. DM/Glucose Control:     Social Service Disposition: OPERATIVE PROCEDURE(s):   CABGx2              POD #       4                52yMale  SURGEON(s): ANISA Sy  SUBJECTIVE ASSESSMENT: pt seen and examined. No acute complaints at this time.   Vital Signs Last 24 Hrs  T(F): 97.6 (15 May 2018 12:00), Max: 97.6 (15 May 2018 12:00)  HR: 98 (15 May 2018 12:00) (88 - 114)  BP: 158/94 (15 May 2018 12:00) (115/84 - 158/94)  BP(mean): 116 (15 May 2018 12:00) (84 - 116)  RR: 18 (15 May 2018 09:20) (9 - 18)  SpO2: 99% (15 May 2018 12:00) (95% - 99%)    I&O's Detail    14 May 2018 07:01  -  15 May 2018 07:00  --------------------------------------------------------  IN:    IV PiggyBack: 100 mL    Oral Fluid: 840 mL  Total IN: 940 mL    OUT:    Chest Tube: 62 mL    Chest Tube: 60 mL    Voided: 1525 mL  Total OUT: 1647 mL        Net:   I&O's Detail    13 May 2018 07:01  -  14 May 2018 07:00  --------------------------------------------------------  Total NET: -1920 mL      14 May 2018 07:01  -  15 May 2018 07:00  --------------------------------------------------------  Total NET: -707 mL        CAPILLARY BLOOD GLUCOSE  109 (15 May 2018 05:45)  149 (14 May 2018 11:00)        Physical Exam:  General: NAD; A&Ox3  Cardiac: S1/S2, RRR, no murmur, no rubs  Lungs: unlabored respirations, CTA b/l, no wheeze, no rales, no crackles  Abdomen: Soft/NT/ND; positive bowel sounds x 4  Sternum: Intact, no click, incision healing well with no drainage  Incisions: Incisions clean/dry/intact  Extremities: No edema b/l lower extremities; good capillary refill; no cyanosis; palpable 1+ pedal pulses b/l    Central Venous Catheter: Yes[]  No[x] , If Yes indication:           Day #  Kaba Catheter: Yes  [] , No  [x] , If yes indication:                      Day #  NGT: Yes [] No [x] ,    If Yes Placement:                                     Day #  EPICARDIAL WIRES:  [] YES [x] NO                                              Day #  BOWEL MOVEMENT:  [x] YES [] NO, If No, Timing since last BM:      Day #  CHEST TUBE(Left/Right):  [] YES [x] NO, If yes -  AIR LEAKS:  [] YES [] NO        LABS:                        9.7<L>  6.64  )-----------( 220      ( 15 May 2018 04:06 )             27.5<L>                        9.6<L>  6.62  )-----------( 184      ( 14 May 2018 02:32 )             27.9<L>    05-15    141  |  105  |  13  ----------------------------<  109<H>  3.9   |  25  |  0.8  05-14    138  |  103  |  15  ----------------------------<  135<H>  4.1   |  25  |  0.8    Ca    8.1<L>      15 May 2018 04:06  Mg     2.1     05-15    TPro  5.4<L> [6.0 - 8.0]  /  Alb  3.9 [3.5 - 5.2]  /  TBili  0.7 [0.2 - 1.2]  /  DBili  0.2 [0.0 - 0.2]  /  AST  13 [0 - 41]  /  ALT  12 [0 - 41]  /  AlkPhos  45 [30 - 115]  05-14          RADIOLOGY & ADDITIONAL TESTS:  CXR: < from: Xray Chest 2 Views PA/Lat (05.15.18 @ 10:39) >  Impression:      No radiographic evidence of acute cardiopulmonary disease.    < end of copied text >    EKG:  MEDICATIONS  (STANDING):  acyclovir   Tablet 400 milliGRAM(s) Oral daily  aspirin 325 milliGRAM(s) Oral daily  atorvastatin 40 milliGRAM(s) Oral at bedtime  dextrose 50% Injectable 12.5 Gram(s) IV Push once  dextrose 50% Injectable 25 Gram(s) IV Push once  docusate sodium 100 milliGRAM(s) Oral three times a day  enoxaparin Injectable 40 milliGRAM(s) SubCutaneous daily  famotidine    Tablet 20 milliGRAM(s) Oral two times a day  guaiFENesin  milliGRAM(s) Oral every 12 hours  magnesium sulfate  IVPB 1 Gram(s) IV Intermittent every 12 hours  metoprolol tartrate 25 milliGRAM(s) Oral two times a day    MEDICATIONS  (PRN):  ALBUTerol/ipratropium for Nebulization 3 milliLiter(s) Nebulizer every 6 hours PRN Shortness of Breath and/or Wheezing  bisacodyl 5 milliGRAM(s) Oral every 12 hours PRN Constipation  ketorolac   Injectable 30 milliGRAM(s) IV Push every 8 hours PRN Moderate Pain (4 - 6)  ondansetron  IVPB 4 milliGRAM(s) IV Intermittent once PRN Nausea and/or Vomiting  oxyCODONE    5 mG/acetaminophen 325 mG 1 Tablet(s) Oral every 6 hours PRN Moderate Pain (4 - 6)  oxyCODONE    5 mG/acetaminophen 325 mG 2 Tablet(s) Oral every 6 hours PRN Severe Pain (7 - 10)  senna 1 Tablet(s) Oral every 12 hours PRN Constipation    HEPARIN:  [] YES [x] NO  Dose: XX UNITS/HR UNITS Q8H  LOVENOX:[x] YES [] NO  Dose: XX mg Q24H  COUMADIN: []  YES [x] NO  Dose: XX mg  Q24H  SCD's: YES b/l  GI Prophylaxis: Protonix [], Pepcid [x], None [], (Contra-indication:.....)    Post-Op Beta-Blockers: Yes [x], No[], If No, then contraindication:  Post-Op Aspirin: Yes [x],  No [], If No, then contraindication:  Post-Op Statin: Yes [x], No[], If No, then contraindication:  Allergies    No Known Allergies    Intolerances      Ambulation/Activity Status: ambulate     Assessment/Plan:  52y Male status-post CABGx2 POD#4  - Case and plan discussed with CTU Intensivist and CT Surgeon - Dr. Sy  - Continue CTU supportive care    - Continue DVT/GI prophylaxis  - Incentive Spirometry 10 times an hour  - Continue to advance physical activity as tolerated and continue PT/OT as directed  1. CAD: Continue ASA, statin, BB  2. HTN: lopressor  3. COPD/Hypoxia: nebs, mucinex      Social Service Disposition:  home

## 2018-05-16 VITALS
TEMPERATURE: 97 F | SYSTOLIC BLOOD PRESSURE: 133 MMHG | RESPIRATION RATE: 17 BRPM | DIASTOLIC BLOOD PRESSURE: 83 MMHG | HEART RATE: 98 BPM | OXYGEN SATURATION: 96 %

## 2018-05-16 LAB
ANION GAP SERPL CALC-SCNC: 13 MMOL/L — SIGNIFICANT CHANGE UP (ref 7–14)
BUN SERPL-MCNC: 14 MG/DL — SIGNIFICANT CHANGE UP (ref 10–20)
CALCIUM SERPL-MCNC: 8.4 MG/DL — LOW (ref 8.5–10.1)
CHLORIDE SERPL-SCNC: 101 MMOL/L — SIGNIFICANT CHANGE UP (ref 98–110)
CO2 SERPL-SCNC: 24 MMOL/L — SIGNIFICANT CHANGE UP (ref 17–32)
CREAT SERPL-MCNC: 0.7 MG/DL — SIGNIFICANT CHANGE UP (ref 0.7–1.5)
GLUCOSE SERPL-MCNC: 109 MG/DL — HIGH (ref 70–99)
HCT VFR BLD CALC: 28.7 % — LOW (ref 42–52)
HGB BLD-MCNC: 10.2 G/DL — LOW (ref 14–18)
MAGNESIUM SERPL-MCNC: 2.1 MG/DL — SIGNIFICANT CHANGE UP (ref 1.8–2.4)
MCHC RBC-ENTMCNC: 30.4 PG — SIGNIFICANT CHANGE UP (ref 27–31)
MCHC RBC-ENTMCNC: 35.5 G/DL — SIGNIFICANT CHANGE UP (ref 32–37)
MCV RBC AUTO: 85.7 FL — SIGNIFICANT CHANGE UP (ref 80–94)
NRBC # BLD: 0 /100 WBCS — SIGNIFICANT CHANGE UP (ref 0–0)
PLATELET # BLD AUTO: 266 K/UL — SIGNIFICANT CHANGE UP (ref 130–400)
POTASSIUM SERPL-MCNC: 4.2 MMOL/L — SIGNIFICANT CHANGE UP (ref 3.5–5)
POTASSIUM SERPL-SCNC: 4.2 MMOL/L — SIGNIFICANT CHANGE UP (ref 3.5–5)
RBC # BLD: 3.35 M/UL — LOW (ref 4.7–6.1)
RBC # FLD: 12.4 % — SIGNIFICANT CHANGE UP (ref 11.5–14.5)
SODIUM SERPL-SCNC: 138 MMOL/L — SIGNIFICANT CHANGE UP (ref 135–146)
WBC # BLD: 5.71 K/UL — SIGNIFICANT CHANGE UP (ref 4.8–10.8)
WBC # FLD AUTO: 5.71 K/UL — SIGNIFICANT CHANGE UP (ref 4.8–10.8)

## 2018-05-16 RX ORDER — METOPROLOL TARTRATE 50 MG
1 TABLET ORAL
Qty: 0 | Refills: 0 | COMMUNITY
Start: 2018-05-16

## 2018-05-16 RX ADMIN — Medication 325 MILLIGRAM(S): at 11:40

## 2018-05-16 RX ADMIN — ENOXAPARIN SODIUM 40 MILLIGRAM(S): 100 INJECTION SUBCUTANEOUS at 11:40

## 2018-05-16 RX ADMIN — Medication 600 MILLIGRAM(S): at 05:26

## 2018-05-16 RX ADMIN — FAMOTIDINE 20 MILLIGRAM(S): 10 INJECTION INTRAVENOUS at 17:07

## 2018-05-16 RX ADMIN — Medication 30 MILLIGRAM(S): at 00:30

## 2018-05-16 RX ADMIN — OXYCODONE AND ACETAMINOPHEN 2 TABLET(S): 5; 325 TABLET ORAL at 06:15

## 2018-05-16 RX ADMIN — Medication 600 MILLIGRAM(S): at 17:08

## 2018-05-16 RX ADMIN — Medication 50 MILLIGRAM(S): at 05:26

## 2018-05-16 RX ADMIN — Medication 100 MILLIGRAM(S): at 14:31

## 2018-05-16 RX ADMIN — OXYCODONE AND ACETAMINOPHEN 1 TABLET(S): 5; 325 TABLET ORAL at 11:39

## 2018-05-16 RX ADMIN — Medication 100 MILLIGRAM(S): at 05:26

## 2018-05-16 RX ADMIN — FAMOTIDINE 20 MILLIGRAM(S): 10 INJECTION INTRAVENOUS at 05:26

## 2018-05-16 RX ADMIN — Medication 100 GRAM(S): at 05:26

## 2018-05-16 RX ADMIN — Medication 50 MILLIGRAM(S): at 17:07

## 2018-05-16 RX ADMIN — OXYCODONE AND ACETAMINOPHEN 2 TABLET(S): 5; 325 TABLET ORAL at 05:28

## 2018-05-16 RX ADMIN — Medication 400 MILLIGRAM(S): at 11:39

## 2021-05-29 NOTE — ED PROVIDER NOTE - DATA REVIEWED, MDM
vital signs - TTE 5/20 RV enlargement and RV systolic dysfunction. Severe reversible diastolic (Grade III) dysfunction. EF 65%  - home torsemide 20mg qd held due to bradycardia and hypotension on admission  - Will continue to monitor and add-on necessary.

## 2024-04-28 NOTE — PATIENT PROFILE ADULT. - NS TRANSFER RESPONSE BELONGING
Bed/Stretcher in lowest position, wheels locked, appropriate side rails in place/Call bell, personal items and telephone in reach/Instruct patient to call for assistance before getting out of bed/chair/stretcher/Non-slip footwear applied when patient is off stretcher/[place] to call system/Physically safe environment - no spills, clutter or unnecessary equipment/Purposeful proactive rounding/Room/bathroom lighting operational, light cord in reach
yes

## 2024-09-07 NOTE — ED PROVIDER NOTE - PRINCIPAL DIAGNOSIS
07-Sep-2024 02:04
Coronary artery disease involving native coronary artery of native heart, angina presence unspecified